# Patient Record
Sex: MALE | Race: BLACK OR AFRICAN AMERICAN | Employment: FULL TIME | URBAN - METROPOLITAN AREA
[De-identification: names, ages, dates, MRNs, and addresses within clinical notes are randomized per-mention and may not be internally consistent; named-entity substitution may affect disease eponyms.]

---

## 2021-04-06 ENCOUNTER — HOSPITAL ENCOUNTER (EMERGENCY)
Age: 56
Discharge: HOME | End: 2021-04-06
Attending: EMERGENCY MEDICINE
Payer: COMMERCIAL

## 2021-04-06 ENCOUNTER — APPOINTMENT (OUTPATIENT)
Dept: GENERAL RADIOLOGY | Age: 56
End: 2021-04-06
Payer: COMMERCIAL

## 2021-04-06 ENCOUNTER — APPOINTMENT (OUTPATIENT)
Dept: CT IMAGING | Age: 56
End: 2021-04-06
Payer: COMMERCIAL

## 2021-04-06 VITALS
HEIGHT: 71 IN | BODY MASS INDEX: 44.1 KG/M2 | TEMPERATURE: 98.1 F | DIASTOLIC BLOOD PRESSURE: 128 MMHG | RESPIRATION RATE: 16 BRPM | SYSTOLIC BLOOD PRESSURE: 189 MMHG | HEART RATE: 111 BPM | OXYGEN SATURATION: 97 % | WEIGHT: 315 LBS

## 2021-04-06 DIAGNOSIS — S52.044A NONDISPLACED FRACTURE OF CORONOID PROCESS OF RIGHT ULNA, INITIAL ENCOUNTER FOR CLOSED FRACTURE: ICD-10-CM

## 2021-04-06 DIAGNOSIS — S43.014A ANTERIOR DISLOCATION OF RIGHT SHOULDER, INITIAL ENCOUNTER: Primary | ICD-10-CM

## 2021-04-06 PROCEDURE — 73030 X-RAY EXAM OF SHOULDER: CPT

## 2021-04-06 PROCEDURE — 6370000000 HC RX 637 (ALT 250 FOR IP): Performed by: STUDENT IN AN ORGANIZED HEALTH CARE EDUCATION/TRAINING PROGRAM

## 2021-04-06 PROCEDURE — 29105 APPLICATION LONG ARM SPLINT: CPT

## 2021-04-06 PROCEDURE — 73070 X-RAY EXAM OF ELBOW: CPT

## 2021-04-06 PROCEDURE — 99283 EMERGENCY DEPT VISIT LOW MDM: CPT

## 2021-04-06 PROCEDURE — 70450 CT HEAD/BRAIN W/O DYE: CPT

## 2021-04-06 PROCEDURE — 73502 X-RAY EXAM HIP UNI 2-3 VIEWS: CPT

## 2021-04-06 PROCEDURE — 6360000002 HC RX W HCPCS: Performed by: STUDENT IN AN ORGANIZED HEALTH CARE EDUCATION/TRAINING PROGRAM

## 2021-04-06 PROCEDURE — 23650 CLTX SHO DSLC W/MNPJ WO ANES: CPT

## 2021-04-06 PROCEDURE — 96374 THER/PROPH/DIAG INJ IV PUSH: CPT

## 2021-04-06 RX ORDER — IBUPROFEN 400 MG/1
600 TABLET ORAL EVERY 6 HOURS PRN
Status: DISCONTINUED | OUTPATIENT
Start: 2021-04-06 | End: 2021-04-06 | Stop reason: HOSPADM

## 2021-04-06 RX ORDER — LIDOCAINE HYDROCHLORIDE 10 MG/ML
20 INJECTION, SOLUTION INFILTRATION; PERINEURAL ONCE
Status: DISCONTINUED | OUTPATIENT
Start: 2021-04-06 | End: 2021-04-06

## 2021-04-06 RX ORDER — PROPOFOL 10 MG/ML
200 INJECTION, EMULSION INTRAVENOUS ONCE
Status: DISCONTINUED | OUTPATIENT
Start: 2021-04-06 | End: 2021-04-06

## 2021-04-06 RX ORDER — FENTANYL CITRATE 50 UG/ML
50 INJECTION, SOLUTION INTRAMUSCULAR; INTRAVENOUS ONCE
Status: COMPLETED | OUTPATIENT
Start: 2021-04-06 | End: 2021-04-06

## 2021-04-06 RX ORDER — IBUPROFEN 800 MG/1
800 TABLET ORAL EVERY 6 HOURS PRN
Qty: 15 TABLET | Refills: 0 | Status: SHIPPED | OUTPATIENT
Start: 2021-04-06

## 2021-04-06 RX ORDER — FENTANYL CITRATE 50 UG/ML
100 INJECTION, SOLUTION INTRAMUSCULAR; INTRAVENOUS ONCE
Status: DISCONTINUED | OUTPATIENT
Start: 2021-04-06 | End: 2021-04-06

## 2021-04-06 RX ADMIN — FENTANYL CITRATE 50 MCG: 50 INJECTION, SOLUTION INTRAMUSCULAR; INTRAVENOUS at 18:30

## 2021-04-06 RX ADMIN — IBUPROFEN 600 MG: 400 TABLET, FILM COATED ORAL at 19:24

## 2021-04-06 ASSESSMENT — ENCOUNTER SYMPTOMS
NAUSEA: 0
RHINORRHEA: 0
COUGH: 0
SHORTNESS OF BREATH: 0
CHOKING: 0
COLOR CHANGE: 0
ALLERGIC/IMMUNOLOGIC NEGATIVE: 1
VOMITING: 0
CHEST TIGHTNESS: 0
STRIDOR: 0
APNEA: 0
WHEEZING: 0
EYES NEGATIVE: 1

## 2021-04-06 ASSESSMENT — PAIN SCALES - GENERAL
PAINLEVEL_OUTOF10: 8
PAINLEVEL_OUTOF10: 7
PAINLEVEL_OUTOF10: 8

## 2021-04-06 ASSESSMENT — PAIN DESCRIPTION - DESCRIPTORS: DESCRIPTORS: ACHING;SHARP

## 2021-04-06 NOTE — ED PROVIDER NOTES
file   Lifestyle    Physical activity     Days per week: Not on file     Minutes per session: Not on file    Stress: Not on file   Relationships    Social connections     Talks on phone: Not on file     Gets together: Not on file     Attends Church service: Not on file     Active member of club or organization: Not on file     Attends meetings of clubs or organizations: Not on file     Relationship status: Not on file    Intimate partner violence     Fear of current or ex partner: Not on file     Emotionally abused: Not on file     Physically abused: Not on file     Forced sexual activity: Not on file   Other Topics Concern    Not on file   Social History Narrative    Not on file       History reviewed. No pertinent family history. Allergies:    Patient has no known allergies. Home Medications:  Prior to Admission medications    Medication Sig Start Date End Date Taking? Authorizing Provider   LISINOPRIL PO Take by mouth Indications: pt unsure of dose. Historical Provider, MD       REVIEW OF SYSTEMS    (2-9 systems for level 4, 10 or more for level 5)    Review of Systems   Constitutional: Negative for activity change, appetite change, chills, diaphoresis, fatigue, fever and unexpected weight change. HENT: Negative for postnasal drip and rhinorrhea. Eyes: Negative. Respiratory: Negative for apnea, cough, choking, chest tightness, shortness of breath, wheezing and stridor. Cardiovascular: Negative for chest pain, palpitations and leg swelling. Gastrointestinal: Negative for nausea and vomiting. Endocrine: Negative. Genitourinary: Negative. Musculoskeletal:        Pain in right shoulder right elbow right hip   Skin: Negative for color change, pallor, rash and wound. Allergic/Immunologic: Negative. Neurological: Positive for numbness. Negative for dizziness, tremors, seizures, syncope, facial asymmetry, speech difficulty, weakness, light-headedness and headaches. EKG):  Orders Placed This Encounter   Procedures    XR SHOULDER RIGHT (MIN 2 VIEWS)    XR ELBOW RIGHT (2 VIEWS)    XR HIP RIGHT (2-3 VIEWS)    CT HEAD WO CONTRAST    XR SHOULDER RIGHT (MIN 2 VIEWS)       MEDICATIONS ORDERED:  Orders Placed This Encounter   Medications    DISCONTD: propofol injection 200 mg    DISCONTD: fentaNYL (SUBLIMAZE) injection 100 mcg    DISCONTD: lidocaine 1 % injection 20 mL    fentaNYL (SUBLIMAZE) injection 50 mcg    ibuprofen (ADVIL;MOTRIN) tablet 600 mg         DIAGNOSTIC RESULTS / EMERGENCYDEPARTMENT COURSE / MDM   LABS:  Labs Reviewed - No data to display    RADIOLOGY:  Xr Shoulder Right (min 2 Views)    Addendum Date: 4/6/2021    ADDENDUM: Additional scapular Y-view confirms anterior dislocation at the glenohumeral joint. Result Date: 4/6/2021  EXAMINATION: THREE XRAY VIEWS OF THE RIGHT SHOULDER 4/6/2021 12:20 pm COMPARISON: None. HISTORY: ORDERING SYSTEM PROVIDED HISTORY: pain in right shoulder after fall TECHNOLOGIST PROVIDED HISTORY: pain in right shoulder after fall FINDINGS: There is a glenohumeral dislocation on the frontal view with a nondiagnostic axillary view, though the dislocation is presumed to be anterior. Acromioclavicular alignment appears grossly maintained. Glenohumeral dislocation, presumed to be anterior which could be confirmed with a scapular Y-view if clinical exam remains indeterminate. Xr Elbow Right (2 Views)    Result Date: 4/6/2021  EXAMINATION: TWO XRAY VIEWS OF THE RIGHT ELBOW 4/6/2021 12:20 pm COMPARISON: None. HISTORY: ORDERING SYSTEM PROVIDED HISTORY: pain in right elbow after fall TECHNOLOGIST PROVIDED HISTORY: pain in right elbow after fall FINDINGS: There is a small linear lucency suggested through the coronoid process of the ulna on the lateral view. Sizable left groin on enthesophyte. Sequelae of chronic medial epicondylitis. No elbow effusion. Posterior soft tissue swelling.      Suggestion of a nondisplaced fracture through the coronoid process of the ulna on the lateral view. Posterior soft tissue swelling. Moderate olecranon enthesophyte and sequelae of chronic medial epicondylitis. Xr Hip Right (2-3 Views)    Result Date: 4/6/2021  EXAMINATION: TWO XRAY VIEWS OF THE RIGHT HIP 4/6/2021 3:20 pm COMPARISON: None. HISTORY: ORDERING SYSTEM PROVIDED HISTORY: pain in right hip after fall TECHNOLOGIST PROVIDED HISTORY: pain in right hip after fall FINDINGS: Visualized bony pelvis is intact. There is no acute osseous abnormality. There is degenerative change of the right hip joint. The surrounding soft tissues are unremarkable. No acute osseous or soft tissue abnormality. Degenerative change of the right hip joint. Ct Head Wo Contrast    Result Date: 4/6/2021  EXAMINATION: CT OF THE HEAD WITHOUT CONTRAST  4/6/2021 1:16 pm TECHNIQUE: CT of the head was performed without the administration of intravenous contrast. Dose modulation, iterative reconstruction, and/or weight based adjustment of the mA/kV was utilized to reduce the radiation dose to as low as reasonably achievable. COMPARISON: None. HISTORY: ORDERING SYSTEM PROVIDED HISTORY: patient on eliquis had a fall. TECHNOLOGIST PROVIDED HISTORY: patient on eliquis had a fall. Decision Support Exception->Emergency Medical Condition (MA) Reason for Exam: PATIENT ON ELIQUIS HAD A FALL FINDINGS: BRAIN/VENTRICLES: There is no acute intracranial hemorrhage, mass effect or midline shift. No abnormal extra-axial fluid collection. The gray-white differentiation is maintained without evidence of an acute infarct. There is no evidence of hydrocephalus. ORBITS: The visualized portion of the orbits demonstrate no acute abnormality.  SINUSES: The visualized paranasal sinuses and mastoid air cells demonstrate no acute abnormality on a background of mild chronic sinus mucosal thickening in the ethmoid air cells and small retention cysts in the right maxillary and left sphenoid sinuses. SOFT TISSUES/SKULL:  No acute abnormality of the visualized skull or soft tissues. Round metallic radiopaque retained foreign body which appears to be a BB in the soft tissues just anterior to the maxilla at the inferolateral aspect of the nose. No acute intracranial abnormality. Apparent retained BB in the soft tissues at the right inferolateral aspect of the nose just anterior to the maxilla. EKG- not required    Impression:        EMERGENCY DEPARTMENT COURSE:  ED Course as of Apr 06 1842   Tue Apr 06, 2021   1516 Patient was evaluated bedside. Will review right shoulder x-ray, right elbow x-ray, right hip x-ray and CT head without contrast.    [YEVGENIY]   1740 X-ray reviewed-showed right elbow dislocation, and right coracoid process undisplaced fracture. No acute abnormalities on right hip and CT head. Planned for relocation of right shoulder and posterior elbow splint. Patient was evaluated again before relocating shoulder but noticed to have clinical improvement in pain and gained range of motion. Repeated bedside x-ray right shoulder which showed-relocation of humerus head. Will continue right arm sling and right posterior elbow splint. [YEVGENIY]   1838 Posterior elbow splint was applied. [YEVGENIY]      ED Course User Index  [YEVGENIY] Lyndsay Henriquez MD       MDM    PROCEDURES:  Right posterior elbow splint applied. CONSULTS:  None    CRITICAL CARE:  Please see attending note    FINAL IMPRESSION     1. Anterior dislocation of right shoulder, initial encounter    2. Nondisplaced fracture of coronoid process of right ulna, initial encounter for closed fracture          DISPOSITION / PLAN   DISPOSITION        Evaluation and treatment course in the ED, and plan of care upon discharge was discussed in length with the patient. Patient had no further questions prior to being discharged and was instructed to return to the ED for new or worsening symptoms.   Any changes to existing medications

## 2021-04-06 NOTE — ED PROVIDER NOTES
[facility]  Emergency Department  Faculty Attestation     I performed a history and physical examination of the patient and discussed management with the resident. I reviewed the residents note and agree with the documented findings and plan of care. Any areas of disagreement are noted on the chart. I was personally present for the key portions of any procedures. I have documented in the chart those procedures where I was not present during the key portions. I have reviewed the emergency nurses triage note. I agree with the chief complaint, past medical history, past surgical history, allergies, medications, social and family history as documented unless otherwise noted below. For Physician Assistant/ Nurse Practitioner cases/documentation I have personally evaluated this patient and have completed at least one if not all key elements of the E/M (history, physical exam, and MDM). Additional findings are as noted. Primary Care Physician:  Carmelo Tarango MD    Screenings:  [unfilled]    CHIEF COMPLAINT       Chief Complaint   Patient presents with    Fall       RECENT VITALS:   Temp: 98.1 °F (36.7 °C),  Pulse: 111, Resp: 16, BP: (!) 146/110    LABS:  Labs Reviewed - No data to display    Radiology  CT HEAD WO CONTRAST   Final Result   No acute intracranial abnormality. Apparent retained BB in the soft tissues at the right inferolateral aspect of   the nose just anterior to the maxilla. XR SHOULDER RIGHT (MIN 2 VIEWS)   Final Result   Addendum 1 of 1   ADDENDUM:   Additional scapular Y-view confirms anterior dislocation at the    glenohumeral   joint. Final      XR ELBOW RIGHT (2 VIEWS)   Final Result   Suggestion of a nondisplaced fracture through the coronoid process of the   ulna on the lateral view. Posterior soft tissue swelling. Moderate olecranon enthesophyte and sequelae of chronic medial epicondylitis.          XR HIP RIGHT (2-3

## 2021-04-06 NOTE — ED NOTES
Pt to ED from work with c/o fall. Pt reports he was at work and fell [employer] of a ladder, into a hole,\" onto his right side. Pt reports he was probably 5ft off of the ground when he fell. Pt is unsure if he hit his head and denies any LOC. Pt sitting in wheelchair. RR even and non labored. NAD noted at this time.       Taina Velez RN  04/06/21 [id number]

## 2021-04-06 NOTE — ED PROVIDER NOTES
Views)    Result Date: 4/6/2021  EXAMINATION: TWO XRAY VIEWS OF THE RIGHT ELBOW 4/6/2021 12:20 pm COMPARISON: None. HISTORY: ORDERING SYSTEM PROVIDED HISTORY: pain in right elbow after fall TECHNOLOGIST PROVIDED HISTORY: pain in right elbow after fall FINDINGS: There is a small linear lucency suggested through the coronoid process of the ulna on the lateral view. Sizable left groin on enthesophyte. Sequelae of chronic medial epicondylitis. No elbow effusion. Posterior soft tissue swelling. Suggestion of a nondisplaced fracture through the coronoid process of the ulna on the lateral view. Posterior soft tissue swelling. Moderate olecranon enthesophyte and sequelae of chronic medial epicondylitis. Xr Hip Right (2-3 Views)    Result Date: 4/6/2021  EXAMINATION: TWO XRAY VIEWS OF THE RIGHT HIP 4/6/2021 3:20 pm COMPARISON: None. HISTORY: ORDERING SYSTEM PROVIDED HISTORY: pain in right hip after fall TECHNOLOGIST PROVIDED HISTORY: pain in right hip after fall FINDINGS: Visualized bony pelvis is intact. There is no acute osseous abnormality. There is degenerative change of the right hip joint. The surrounding soft tissues are unremarkable. No acute osseous or soft tissue abnormality. Degenerative change of the right hip joint. Ct Head Wo Contrast    Result Date: 4/6/2021  EXAMINATION: CT OF THE HEAD WITHOUT CONTRAST  4/6/2021 1:16 pm TECHNIQUE: CT of the head was performed without the administration of intravenous contrast. Dose modulation, iterative reconstruction, and/or weight based adjustment of the mA/kV was utilized to reduce the radiation dose to as low as reasonably achievable. COMPARISON: None. HISTORY: ORDERING SYSTEM PROVIDED HISTORY: patient on elideborahis had a fall. TECHNOLOGIST PROVIDED HISTORY: patient on eliquis had a fall.  Decision Support Exception->Emergency Medical Condition (MA) Reason for Exam: PATIENT ON ELIDEBORAHIS HAD A FALL FINDINGS: BRAIN/VENTRICLES: There is no acute intracranial

## 2021-05-03 ENCOUNTER — OFFICE VISIT (OUTPATIENT)
Dept: ORTHOPEDIC SURGERY | Age: 56
End: 2021-05-03
Payer: COMMERCIAL

## 2021-05-03 VITALS — BODY MASS INDEX: 44.1 KG/M2 | WEIGHT: 315 LBS | HEIGHT: 71 IN

## 2021-05-03 DIAGNOSIS — S43.014D ANTERIOR DISLOCATION OF RIGHT HUMERUS, SUBSEQUENT ENCOUNTER: Primary | ICD-10-CM
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
## 2021-05-03 DIAGNOSIS — M25.511 RIGHT SHOULDER PAIN, UNSPECIFIED CHRONICITY: ICD-10-CM

## 2021-05-03 PROCEDURE — 99204 OFFICE O/P NEW MOD 45 MIN: CPT | Performed by: ORTHOPAEDIC SURGERY

## 2021-05-03 RX ORDER — TERAZOSIN 10 MG/1
10 CAPSULE ORAL 2 TIMES DAILY
COMMUNITY
Start: 2021-03-24

## 2021-05-03 RX ORDER — CARVEDILOL 25 MG/1
TABLET ORAL
COMMUNITY
Start: 2021-03-11

## 2021-05-03 RX ORDER — SPIRONOLACTONE AND HYDROCHLOROTHIAZIDE 50; 50 MG/1; MG/1
TABLET, FILM COATED ORAL
COMMUNITY
Start: 2021-03-12

## 2021-05-03 ASSESSMENT — ENCOUNTER SYMPTOMS
ABDOMINAL PAIN: 0
COUGH: 0
APNEA: 0
CHEST TIGHTNESS: 0
VOMITING: 0

## 2021-05-03 NOTE — LETTER
[facility]  [address]  Phone: [phone]  Fax: [facility] Arian Lara DO        May 3, 2021     Patient: Steve Del Castillo   YOB: 1965   Date of Visit: 5/3/2021       To Whom it May Concern:    Juan Akers was seen in my clinic on 5/3/2021. He was seen in the office starting at 8 am until 10:30am. Patient can return to work 5/3/21 with restrictions of no overhead work and no lifting more than 5 pounds. Patient is okay to drive. If you have any questions or concerns, please don't hesitate to call.     Sincerely,       Elsa Overton, DO

## 2021-05-03 NOTE — PROGRESS NOTES
Neurological: Negative for dizziness, weakness and numbness. I have reviewed the CC, HPI, ROS, PMH, FHX, Social History, and if not present in this note, I have reviewed in the patient's chart. I agree with the documentation provided by other staff and have reviewed their documentation prior to providing my signature indicating agreement. Objective :   General: Karen Abraham is a 50 y.o. male who is alert and oriented and sitting comfortably in our office. Ortho Exam  MS:   F=110 degrees right shoulder. Abduction is 80 degrees. No gross instability of the right shoulder is appreciated. Rotator cuff strength appears to be intact and preserved bilaterally. Motor, sensory, vascular examination of the right upper extremity is grossly intact without focal deficits. Patient has full range of motion of the cervical spine and right elbow. Neuro: alert. oriented  Eyes: Extra-ocular muscles intact  Mouth: Oral mucosa moist. No perioral lesions  Pulm: Respirations unlabored and regular. Skin: warm, well perfused  Psych:   Patient has good fund of knowledge and displays understanging of exam, diagnosis, and plan. Radiology:     Xr Shoulder Right (min 2 Views)    Result Date: 5/3/2021  History: Right shoulder pain Findings: AP, scapular Y, axial view x-rays of the right shoulder done in the office today shows mild glenohumeral joint space narrowing, sclerotic changes at the greater tuberosity insertion of the rotator cuff, mild to moderate degenerative narrowing at the acromioclavicular joint. Humeral head is well-maintained within the glenoid. No further evidence of fracture, subluxation, dislocation, radiopaque foreign body, radiopaque tumors noted. Impression: Mild right shoulder degenerative changes as described above.     Xr Shoulder Right (min 2 Views)    Result Date: 4/6/2021  EXAMINATION: THREE XRAY VIEWS OF THE RIGHT SHOULDER 4/6/2021 2:29 pm COMPARISON: Shoulder radiographs from earlier today HISTORY: ORDERING SYSTEM PROVIDED HISTORY: reevaluate for relocation. TECHNOLOGIST PROVIDED HISTORY: reevaluate for relocation. Reason for Exam: fell FINDINGS: The humeral head is now appropriately seated in the glenoid fossa status post reduction without definitive fracture. Reduction of the anterior glenohumeral dislocation which is now in appropriate alignment. While there is no discrete fracture appreciated radiographically, CT is available if clinical concern persists. Xr Shoulder Right (min 2 Views)    Addendum Date: 4/6/2021    ADDENDUM: Additional scapular Y-view confirms anterior dislocation at the glenohumeral joint. Result Date: 4/6/2021  EXAMINATION: THREE XRAY VIEWS OF THE RIGHT SHOULDER 4/6/2021 12:20 pm COMPARISON: None. HISTORY: ORDERING SYSTEM PROVIDED HISTORY: pain in right shoulder after fall TECHNOLOGIST PROVIDED HISTORY: pain in right shoulder after fall FINDINGS: There is a glenohumeral dislocation on the frontal view with a nondiagnostic axillary view, though the dislocation is presumed to be anterior. Acromioclavicular alignment appears grossly maintained. Glenohumeral dislocation, presumed to be anterior which could be confirmed with a scapular Y-view if clinical exam remains indeterminate. Xr Elbow Right (2 Views)    Result Date: 4/6/2021  EXAMINATION: TWO XRAY VIEWS OF THE RIGHT ELBOW 4/6/2021 12:20 pm COMPARISON: None. HISTORY: ORDERING SYSTEM PROVIDED HISTORY: pain in right elbow after fall TECHNOLOGIST PROVIDED HISTORY: pain in right elbow after fall FINDINGS: There is a small linear lucency suggested through the coronoid process of the ulna on the lateral view. Sizable left groin on enthesophyte. Sequelae of chronic medial epicondylitis. No elbow effusion. Posterior soft tissue swelling. Suggestion of a nondisplaced fracture through the coronoid process of the ulna on the lateral view. Posterior soft tissue swelling.  Moderate olecranon enthesophyte and body which appears to be a BB in the soft tissues just anterior to the maxilla at the inferolateral aspect of the nose. No acute intracranial abnormality. Apparent retained BB in the soft tissues at the right inferolateral aspect of the nose just anterior to the maxilla. Assessment:      1. Anterior dislocation of right humerus, subsequent encounter    2. Right shoulder pain, unspecified chronicity    3. Fall from ladder, subsequent encounter         Plan:    Reviewed x-rays with the patient today in the office. Discussed with the patient that his shoulder appears to be stable on exam and x-ray. With the patient already having 4 weeks of rest I feel it is neccessary for the patient to start formal therapy. This will have to be approved by [facility] before he can start formal therapy. The office will send C-9 to [facility] for approval of therapy. Patient can dicontinue sling at this time. The patient is okay to return to work today with restrictions of No lifting more than 5 pounds and no over the shoulder work. Follow up in the office in 6 weeks. The patient knows to call with any questions or concerns. Follow up: Return in about 6 weeks (around 6/14/2021). No orders of the defined types were placed in this encounter. Orders Placed This Encounter   Procedures    XR SHOULDER RIGHT (MIN 2 VIEWS)     Standing Status:   Future     Number of Occurrences:   1     Standing Expiration Date:   5/4/2022     IKelly LPN am scribing for and in the presence of Dr. Ravindra Carballo  5/3/2021 5:49 PM      I have reviewed and made changes accordingly to the work scribed by Kelly Montana LPN. The documentation accurately reflects work and decisions made by me. I have also reviewed documentation completed by clinical staff.     Ravindra Carballo DO, [address] [facility] Sports Medicine  5/4/2021 6:51 AM    This note is created with the assistance of a speech recognition program.  While intending

## 2021-05-07 ENCOUNTER — TELEPHONE (OUTPATIENT)
Dept: ORTHOPEDIC SURGERY | Age: 56
End: 2021-05-07

## 2021-05-07 NOTE — TELEPHONE ENCOUNTER
Megan Charles from [employer] called to request a consult report/office notes from the following date:  5/3/21    Please fax to: [phone] attn: Jay Whalen #: [phone]
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
## 2021-05-13 DIAGNOSIS — M25.511 RIGHT SHOULDER PAIN, UNSPECIFIED CHRONICITY: Primary | ICD-10-CM

## 2021-05-13 DIAGNOSIS — S43.014D ANTERIOR DISLOCATION OF RIGHT HUMERUS, SUBSEQUENT ENCOUNTER: ICD-10-CM

## 2021-05-19 ENCOUNTER — HOSPITAL ENCOUNTER (OUTPATIENT)
Dept: PHYSICAL THERAPY | Age: 56
Setting detail: THERAPIES SERIES
Discharge: HOME | End: 2021-05-19
Payer: COMMERCIAL

## 2021-05-19 PROCEDURE — 97161 PT EVAL LOW COMPLEX 20 MIN: CPT

## 2021-05-19 PROCEDURE — 97110 THERAPEUTIC EXERCISES: CPT

## 2021-05-19 NOTE — CONSULTS
[x] Benedicto Hockey  Outpatient Physical Therapy  [address]  Phone: [phone]  Fax: [phone] [] [facility] at [address] [place] [address]  Phone: [phone]   Fax: [phone]     Physical Therapy Evaluation    Date:  2021  Patient: Prema Bruno  : 1965  MRN: [id number]  Physician: Tyrel Walker DO   Insurance: [facility] 18 visits 21-21  Medical Diagnosis: Right shoulder pain, unspecified chronicity (M25.511 [ICD-10-CM]); Fall from ladder, subsequent encounter (W11. XXXD [ICD-10-CM]); Anterior dislocation of right humerus, subsequent encounter (S43.014D [ICD-10-CM])    Rehab Codes: M25.511, M25.611  Onset Date: 21                                Next 's appt:     Subjective:   CC: Patient reports ongoing R shoulder pain and limited function following his work injury. Patient currently has difficulty with all shoulder motion. He is unable to lift, carry, push or pull with his R arm. HPI: Patient was injured working as a , states his ladder slipped and he fell to the ground while on the ladder injuring his R shoulder. SR shoulder was dislocated anteriorly. Patient went to ER the following day. PMHx: [] Unremarkable [] Diabetes [x] HTN  [] Pacemaker   [] MI/Heart Problems [] Cancer [x] Arthritis [] Asthma                         [x] refer to full medical chart  In EPIC  [x] Other: Hx of DVT in L LE early         Comorbidities:   [x] Obesity [] Dialysis  [] Other:   [] Asthma/COPD [] Dementia [] Other:   [] Stroke [] Sleep apnea [] Other:   [] Vascular disease [] Rheumatic disease [] Other:     Tests: [x] X-Ray:5/3/21 [x] MRI:  [] Other:        Impression: Mild right shoulder degenerative changes as described above.        Medications: [x] Refer to full medical record [] None [] Other:  Allergies:      [x] Refer to full medical record  [] None [] Other:    Working:  [x] Full-time  [] Part-time  [] Off check the appropriate box. [x]  No intervention needed   Low =   Score of 0-24    OBSERVATION Comments   Posture Forward head, rounded shoulders, slumped sitting    Joint Alignment R shoulder protracted and depressed    Gait No Deficit    Palpation Diffuse tenderness anterior shoulder   Edema No Deficit    Neurological No Deficit      Assessment: Patient presents with R shoulder pain and dysfunction falling work injury. Concern for associated rotator cuff injury with anterior dislocation. Patient will benefit from from physical therapy to restore R shoulder ROM, strength, and function to return to full duty work. Problems:    [x] ? Pain:  4/10 at rest, 9/10 with movement R shoulder   [x] ? ROM: limited all planes of motion  [x] ? Flexibility:rotator cuff, pectorals, R Lat   [x] ? Strength: deltoids, rotator cuff, scapular stabilizers  [x] ? Function: UEFI: 16% functional   [] Other:    G: (to be met in 18 treatments)  1. ? Pain:2/10 R shoulder pain with over head activity. 2. ? ROM: R shoulder flexion to 150, abduction to 90, ER and IR to 60 degrees to improve reaching. 3. ? Strength: Patient is able to lift and carry 50 lb box without Right shoulder pain. 4. ? Function: UEFI score to 75% functional to return to full duty work. 5. Independent with Home Exercise Programs    Patient goals: Restore R shoulder function, RTW. Rehab Potential:  [x] Good  [] Fair  [] Poor   Suggested Professional Referral:  [x] No  [] Yes:  Barriers to Goal Achievement[de-identified]  [x] No  [] Yes:  Domestic Concerns:  [x] No  [] Yes:    Pt. Education:  [x] Plans/Goals, Risks/Benefits discussed  [x] Home exercise program: See chart below  Method of Education: [x] Verbal  [x] Demo  [x] Written  Comprehension of Education:  [x] Verbalizes understanding. [x] Demonstrates understanding. [x] Needs Review. [] Demonstrates/verbalizes understanding of HEP/Ed previously given.     Treatment Plan:  [x] Therapeutic Exercise   05267  [x] Vasopneumatic cold with compression  42710    [x] Therapeutic Activity  26148 [x] Cold/hotpack    [] Gait Training   [id number] [] Lumbar/Cervical Traction  [id number]   [x] Neuromuscular Re-education  [phone] [x] Electrical Stimulation Unattended  77694   [x] Manual Therapy    02255 [x] Electrical Stimulation Attended  [id number]   [] Iontophoresis: 4 mg/mL Dexamethasone Sodium Phosphate  mAmin  94243 []  Medication allergies reviewed for use of   Dexamethasone Sodium Phosphate 4mg/ml with iontophoresis treatments. Pt is not allergic. Frequency:  3 x/week for 18 visits    Todays Treatment:  Precautions:  Modalities:   Exercises:  Access Code: [email or web address]  URL: [email or web address]  Date: 05/19/2021  Prepared by: Kirstie Filler    Exercises  Seated Shoulder Flexion Towel Slide at Table Top - 1 x daily - 7 x weekly - 10 reps - 3 sets  Seated Shoulder Abduction Towel Slide at Table Top - 1 x daily - 7 x weekly - 10 reps - 3 sets  Circular Shoulder Pendulum with Table Support - 1 x daily - 7 x weekly - 10 reps - 3 sets  Seated Shoulder Shrug Circles AROM Backward - 1 x daily - 7 x weekly - 10 reps - 3 sets  Seated Elbow Flexion and Extension AROM - 1 x daily - 7 x weekly - 10 reps - 3 sets    Specific Instructions for next treatment: Begin gentle ROM exercises, likely start with PROM, pain modalities as needed.        Evaluation Complexity:  History (Personal factors, comorbidities) [] 0 [x] 1-2 [] 3+   Exam (limitations, restrictions) [] 1-2 [] 3 [] 4+   Clinical presentation (progression) [x] Stable [] Evolving  [] Unstable   Decision Making [x] Low [] Moderate [] High    [x] Low Complexity [] Moderate Complexity [] High Complexity       Treatment Charges: Mins Units   [x] Evaluation       [x]  Low       []  Moderate       []  High 25 1   []  Modalities     [x]  Ther Exercise 15 1   []  Manual Therapy     []  Ther Activities     []  Aquatics     []  Vasocompression     []  Other       TOTAL TREATMENT TIME: 40       Time

## 2021-05-24 ENCOUNTER — HOSPITAL ENCOUNTER (OUTPATIENT)
Dept: PHYSICAL THERAPY | Age: 56
Setting detail: THERAPIES SERIES
Discharge: HOME | End: 2021-05-24
Payer: COMMERCIAL

## 2021-05-24 PROCEDURE — 97110 THERAPEUTIC EXERCISES: CPT

## 2021-05-24 PROCEDURE — 97140 MANUAL THERAPY 1/> REGIONS: CPT

## 2021-05-24 PROCEDURE — 97016 VASOPNEUMATIC DEVICE THERAPY: CPT

## 2021-05-24 NOTE — FLOWSHEET NOTE
[x] [facility]) [facility] &  Therapy  [address]  [phone]  F: [phone] [] [address]  [place] [address]   [address]  P: [phone]  F: [phone] [] [place]  [address]  P: [phone]  F: [phone] [] [address]  P: [phone]  F: [phone] [] [address]  [facility]   [address]   [place]: [phone]  F: [phone]      Physical Therapy Daily Treatment Note    Date:  2021  Patient Name:  Ancelmo Clarke    :  1965  MRN: [id number]  Physician: Jackeline Doyle DO                     Insurance: [facility] 18 visits 21-21  Medical Diagnosis: Right shoulder pain, unspecified chronicity (M25.511 [ICD-10-CM]); Fall from ladder, subsequent encounter (W11. XXXD [ICD-10-CM]); Anterior dislocation of right humerus, subsequent encounter (S43.014D [ICD-10-CM])                          Rehab Codes: M25.511, M25.611  Onset Date: 21                                Next 's appt:  Visits:     Cancels/No Shows: 0/0    Subjective:    Pain:  [x] Yes  [] No Location: R shoulder  Pain Rating: (0-10 scale) 7/10  Pain altered Tx:  [] No  [] Yes  Action:  Comments: Shoulder is still painful with all motion. Patient has been compliant with HEP.      Objective:  Modalities: Vasopneumatic cryotherapy device (GameReady) to R shoulder at low pressure and 38 degrees x 15 min  Precautions:  Exercises:  Exercise Reps/ Time Weight/ Level Comments   Pulleys 2 min            Seated      Table slides 20x ea  Flexion, abduction   ER stretch on table  20x     Bicep curls  20x 3 lb    Shrugs  20x     Scap retraction  20x     Pronation supination  20x 1 lb          Supine      Cane press 20x     Cane flexion position for throwing). o Rope and Pulley  o Wand  o Finger Walk  3. Posterior cuff stretch in supine (cross arm adduction)  4. Manual stretching avoiding stretching to the anterior capsule (ER in the scapular plane and no shoulder extension)  5. Functional behind the back stretch (IR towel stretch) if needed  6. Mobilization of posterior cuff if needed  7. Elastic resistance for IR/ER with arm at side and elbow at 90 degrees (pain free ROM with ER): and scapular strengthening (shrugs, rows, etc.)  8. UBE  9. DB exercises for:  o Supraspinatus - full can in the scapular plane below shoulder level. o Shoulder flexion  o Shoulder abduction (pain free)  o Shoulder extension in prone (do not move the shoulder past the plane of the body)  o Shoulder rows in prone  o Serratus punch in supine (push up plus program)  o Shoulder shrugs  o Forearm/elbow strengthening  10. Rhythmic stabilization exercises (begin in the supine position progressing to the functional planes of motion)  11. PNF patterns with gentle manual resistance (progress by working into the dysfunctional plane of motion)  PHASE II:  1. Continue posterior cuff stretching  2. Continue shoulder strengthening exercises with free weights and elastic resistance (emphasize eccentric work on the rotator cuff, progress planes of motion to the 90/90 position)  3. Add lower trap pull downs with pulley system if available  4. Progress prone DB program by adding:  o horizontal abduction  o retraction with ER  o extension with palm forward  5. Plyotoss chest pass (progress to overhead and single arm)  6. Progress push plus program (wall push ups, modified floor, floor)  7. Begin progressive throwing program as advised by MD  8. Begin total body conditioning including a well organized core stability program for overhead athletes  9. Begin skill development at a low intensity level  10.  Continue with rhythmic stabilization exercises with resistance and in the functional planes of motion. 11. Continue PNF patterns  12. Utilize manual resisted techniques or elastic resistance to emphasize eccentric loading for the posterior cuff. PHASE III:  Focus on progressive exercises in preparation for returning to the prior activity level. 1. Continue flexibility/mobility exercises  2. Continue progressive throwing program  3. Continue with strengthening  4. May add overhead strengthening ( press)  5. May progress to bench program.  o Regular  o Incline  o Decline  6. Continue UBE  7. Continue total body conditioning  8. Progress skill development. Begin practicing skills specific to the activity (work, recreational activity, sport, etc.).         Time In: 4:10 pm          Time Out: 5:10 pm    Electronically signed by:  Kimberly Hunt PT

## 2021-05-26 ENCOUNTER — HOSPITAL ENCOUNTER (OUTPATIENT)
Dept: PHYSICAL THERAPY | Age: 56
Setting detail: THERAPIES SERIES
Discharge: HOME | End: 2021-05-26
Payer: COMMERCIAL

## 2021-05-26 PROCEDURE — 97110 THERAPEUTIC EXERCISES: CPT

## 2021-05-26 PROCEDURE — 97140 MANUAL THERAPY 1/> REGIONS: CPT

## 2021-05-26 PROCEDURE — 97016 VASOPNEUMATIC DEVICE THERAPY: CPT

## 2021-05-26 NOTE — FLOWSHEET NOTE
[x] [facility]) [facility] &  Therapy  [address]  [phone]  F: [phone] [] [address]  [facility]   [address]  P: [phone]  F: [phone] [] [address]  [address]  P: [phone]  F: [phone] [] [address]  P: [phone]  F: [phone] [] [address]  [facility]   [address]   [place]: [phone]  F: [phone]      Physical Therapy Daily Treatment Note    Date:  2021  Patient Name:  Danielle Esparza    :  1965  MRN: [id number]  Physician: aCyetano Myers DO                     Insurance: [facility] 18 visits 21-21  Medical Diagnosis: Right shoulder pain, unspecified chronicity (M25.511 [ICD-10-CM]); Fall from ladder, subsequent encounter (W11. XXXD [ICD-10-CM]); Anterior dislocation of right humerus, subsequent encounter (S43.014D [ICD-10-CM])                          Rehab Codes: M25.511, M25.611  Onset Date: 21                                Next 's appt:  Visits: 3/18    Cancels/No Shows: 0/0    Subjective:    Pain:  [x] Yes  [] No Location: R shoulder  Pain Rating: (0-10 scale) 7/10  Pain altered Tx:  [] No  [] Yes  Action:  Comments: Patient woke up with high pain level in his shoulder and his knees.     Objective:  Modalities: Vasopneumatic cryotherapy device (GameReady) to R shoulder at low pressure and 38 degrees x 15 min  Precautions:  Exercises:  Exercise Reps/ Time Weight/ Level Comments   Pulleys 2 min      UBE 2/2 min            Seated      Table slides w/ball  20x ea green Flexion, abduction, side to side circles    ER stretch on table  20x     Bicep curls  20x 4 lb    Shrugs  20x     Scap retraction  20x     Pronation supination  20x 1 lb Supine      Cane press 20x     Cane flexion  10x                       Other:    Manual Therapy: PROM to R shoulder with gentle distraction in all planes of motion x 10 minutes. Treatment Charges: Mins Units    []  Modalities      [x]  Ther Exercise 35 2    []  Manual Therapy 10 1    []  Ther Activities      []  Aquatics      [x]  Vasocompression 10 1    []  Other      Total Treatment time 55 4        Assessment: [x] Progressing toward goals. AAROM flexion and abduction showed improvement this treatment. Active motion remains painful. [x] No change. [] Other:  [x] Patient would continue to benefit from skilled physical therapy services in order to: Patient will benefit from from physical therapy to restore R shoulder ROM, strength, and function to return to full duty work. STG/LTG  G: (to be met in 18 treatments)  1. ? Pain:2/10 R shoulder pain with over head activity. 2. ? ROM: R shoulder flexion to 150, abduction to 90, ER and IR to 60 degrees to improve reaching. 3. ? Strength: Patient is able to lift and carry 50 lb box without Right shoulder pain. 4. ? Function: UEFI score to 75% functional to return to full duty work. 5. Independent with Home Exercise Programs      Pt. Education:  [x] Yes  [] No  [x] Reviewed Prior HEP/Ed  Method of Education: [x] Verbal  [x] Demo  [] Written  Comprehension of Education:  [x] Verbalizes understanding. [x] Demonstrates understanding. [x] Needs review. [] Demonstrates/verbalizes HEP/Ed previously given. Plan: [x] Continue current frequency toward long and short term goals. [x] Specific Instructions for subsequent treatments: Add cane abduction and horizontal abduction . Rehab Protocol   PHASE I:  1. Apply modalities as needed (heat, ice, electrotherapy, etc.)  2. Perform range of motion exercises (passive, active-assistive) avoid abduction, extension and external rotation (cocked position for throwing).   o Rope and Pulley  o Wand  o Finger Walk  3. Posterior cuff stretch in supine (cross arm adduction)  4. Manual stretching avoiding stretching to the anterior capsule (ER in the scapular plane and no shoulder extension)  5. Functional behind the back stretch (IR towel stretch) if needed  6. Mobilization of posterior cuff if needed  7. Elastic resistance for IR/ER with arm at side and elbow at 90 degrees (pain free ROM with ER): and scapular strengthening (shrugs, rows, etc.)  8. UBE  9. DB exercises for:  o Supraspinatus - full can in the scapular plane below shoulder level. o Shoulder flexion  o Shoulder abduction (pain free)  o Shoulder extension in prone (do not move the shoulder past the plane of the body)  o Shoulder rows in prone  o Serratus punch in supine (push up plus program)  o Shoulder shrugs  o Forearm/elbow strengthening  10. Rhythmic stabilization exercises (begin in the supine position progressing to the functional planes of motion)  11. PNF patterns with gentle manual resistance (progress by working into the dysfunctional plane of motion)  PHASE II:  1. Continue posterior cuff stretching  2. Continue shoulder strengthening exercises with free weights and elastic resistance (emphasize eccentric work on the rotator cuff, progress planes of motion to the 90/90 position)  3. Add lower trap pull downs with pulley system if available  4. Progress prone DB program by adding:  o horizontal abduction  o retraction with ER  o extension with palm forward  5. Plyotoss chest pass (progress to overhead and single arm)  6. Progress push plus program (wall push ups, modified floor, floor)  7. Begin progressive throwing program as advised by MD  8. Begin total body conditioning including a well organized core stability program for overhead athletes  9. Begin skill development at a low intensity level  10. Continue with rhythmic stabilization exercises with resistance and in the functional planes of motion. 11. Continue PNF patterns  12.  Utilize manual resisted techniques or elastic resistance to emphasize eccentric loading for the posterior cuff. PHASE III:  Focus on progressive exercises in preparation for returning to the prior activity level. 1. Continue flexibility/mobility exercises  2. Continue progressive throwing program  3. Continue with strengthening  4. May add overhead strengthening ( press)  5. May progress to bench program.  o Regular  o Incline  o Decline  6. Continue UBE  7. Continue total body conditioning  8. Progress skill development. Begin practicing skills specific to the activity (work, recreational activity, sport, etc.).         Time In: 4:00 pm          Time Out: 5:00pm    Electronically signed by:  Chance Vasquez PT

## 2021-05-28 ENCOUNTER — HOSPITAL ENCOUNTER (OUTPATIENT)
Dept: PHYSICAL THERAPY | Age: 56
Setting detail: THERAPIES SERIES
Discharge: HOME | End: 2021-05-28
Payer: COMMERCIAL

## 2021-05-28 PROCEDURE — 97110 THERAPEUTIC EXERCISES: CPT

## 2021-05-28 NOTE — FLOWSHEET NOTE
[x] [facility]) - [facility] &  Therapy  [address]  [phone]  F: [phone] [] [address]  [place] [address]   [address]  P: [phone]  F: [phone] [] [place]  [address]  P: [phone]  F: [phone] [] [address]  P: [phone]  F: [phone] [] [address]  [facility]   [address]   [place]: [phone]  F: [phone]      Physical Therapy Daily Treatment Note    Date:  2021  Patient Name:  Jamie Tomlinson    :  1965  MRN: [id number]  Physician: Vinny Jimenez DO                     Insurance: [facility] 18 visits 21-21  Medical Diagnosis: Right shoulder pain, unspecified chronicity (M25.511 [ICD-10-CM]); Fall from ladder, subsequent encounter (W11. XXXD [ICD-10-CM]); Anterior dislocation of right humerus, subsequent encounter (S43.014D [ICD-10-CM])                          Rehab Codes: M25.511, M25.611  Onset Date: 21                                Next 's appt: 21  Visits:     Cancels/No Shows: 0/0    Subjective:    Pain:  [x] Yes  [] No Location: R shoulder  Pain Rating: (0-10 scale) 7/10  Pain altered Tx:  [x] No  [] Yes  Action:  Comments: States that he is aching today secondary to the weather. Notes he does take a lot of medications. Pain has been 3/10 in shoulder recently but could not pin point when. Patient notes he is sore after PT session but will push through as he wants to get back to work.      Objective:  Modalities: Vasopneumatic cryotherapy device (GameReady) to R shoulder at low pressure and 38 degrees x 15 min--patient declined need  Precautions:  Exercises:  Exercise Reps/ Time Weight/ Level Comments   Pulleys 2 min      UBE   2/2 min Seated      Table slides w/ball   20x ea green Flexion, abduction, side to side circles    ER stretch on table   20x     Bicep curls   20x 4 lb    Shrugs   20x     Scap retraction  20x     Pronation supination    20x 1 lb          Supine      Cane press    20x     Cane flexion     10x     Cane abduction 10x  added   Cane horizontal abduction 10x  added         Other:    Manual Therapy: PROM to R shoulder with gentle distraction in all planes of motion x 10 minutes. Treatment Charges: Mins Units    []  Modalities      [x]  Ther Exercise 35 2 2870-6790   []  Manual Therapy      []  Ther Activities      []  Aquatics      []  Vasocompression      []  Other      Total Treatment time 35 mins     -347    Assessment: [x] Progressing toward goals. Added supine cane abduction and horizontal abduction within pain free range to increase shoulder ROM. Patient declined need for game ready this date. [x] No change. Pain noted still with exercises. [] Other:  [x] Patient would continue to benefit from skilled physical therapy services in order to: Patient will benefit from from physical therapy to restore R shoulder ROM, strength, and function to return to full duty work. STG/LTG  G: (to be met in 18 treatments)  1. ? Pain:2/10 R shoulder pain with over head activity. 2. ? ROM: R shoulder flexion to 150, abduction to 90, ER and IR to 60 degrees to improve reaching. 3. ? Strength: Patient is able to lift and carry 50 lb box without Right shoulder pain. 4. ? Function: UEFI score to 75% functional to return to full duty work. 5. Independent with Home Exercise Programs      Pt. Education:  [x] Yes  [] No  [x] Reviewed Prior HEP/Ed  Method of Education: [x] Verbal  [x] Demo  [] Written  Comprehension of Education:  [x] Verbalizes understanding. [x] Demonstrates understanding. [x] Needs review. [] Demonstrates/verbalizes HEP/Ed previously given.      Plan: [x] Continue current frequency toward long floor)  7. Begin progressive throwing program as advised by MD  8. Begin total body conditioning including a well organized core stability program for overhead athletes  9. Begin skill development at a low intensity level  10. Continue with rhythmic stabilization exercises with resistance and in the functional planes of motion. 11. Continue PNF patterns  12. Utilize manual resisted techniques or elastic resistance to emphasize eccentric loading for the posterior cuff. PHASE III:  Focus on progressive exercises in preparation for returning to the prior activity level. 1. Continue flexibility/mobility exercises  2. Continue progressive throwing program  3. Continue with strengthening  4. May add overhead strengthening ( press)  5. May progress to bench program.  o Regular  o Incline  o Decline  6. Continue UBE  7. Continue total body conditioning  8. Progress skill development. Begin practicing skills specific to the activity (work, recreational activity, sport, etc.).         Time In: 3:43 pm          Time Out: 4:22 pm    Electronically signed by:  Amita Rodriguez PTA

## 2021-06-01 ENCOUNTER — HOSPITAL ENCOUNTER (OUTPATIENT)
Dept: PHYSICAL THERAPY | Age: 56
Setting detail: THERAPIES SERIES
Discharge: HOME | End: 2021-06-01
Payer: COMMERCIAL

## 2021-06-01 NOTE — FLOWSHEET NOTE
[x] [facility] &  Therapy  [address]    [phone]  F: [phone]   [] [address]  [facility] [address]   [address]  P: [phone]  F: [phone]  [] [address] &  Therapy  [address]  P: [phone]  F: [phone] [] [address]  P: [phone]  F: [phone]  [] [address] [place] [address]  [address] [facility] [address]   [address]   [place]: [phone]  F: [phone]   [] [place] [address] [facility] [address]  [place]: [phone]   F: [phone]     Physical Therapy Cancel/No Show note    Date: 2021  Patient: Rui Fields  : 1965  MRN: [id number]    Cancels/No Shows to date:     For today's appointment patient:    [x]  Cancelled    [] Rescheduled appointment    [] No-show     Reason given by patient:    [x]  Patient ill     []  Conflicting appointment    [] No transportation      [] Conflict with work    [] No reason given    [] Weather related    [] [id number]    [] Other:      Comments:        [] Next appointment was confirmed    Electronically signed by: Paul Muro PT

## 2021-06-02 ENCOUNTER — HOSPITAL ENCOUNTER (OUTPATIENT)
Dept: PHYSICAL THERAPY | Age: 56
Setting detail: THERAPIES SERIES
Discharge: HOME | End: 2021-06-02
Payer: COMMERCIAL

## 2021-06-02 PROCEDURE — 97110 THERAPEUTIC EXERCISES: CPT

## 2021-06-02 NOTE — FLOWSHEET NOTE
[x] [facility] &  Therapy  [address]  [phone]  F: [phone] [] [address]  [address]   [address]  P: [phone]  F: [phone] [] [place]  [address]  P: [phone]  F: [phone] [] [address]  P: [phone]  F: [phone] [] [address]  [facility]   [address]   [place]: [phone]  F: [phone]      Physical Therapy Daily Treatment Note    Date:  2021  Patient Name:  Ancelmo Clarke    :  1965  MRN: [id number]  Physician: Jackeline Doyle DO                     Insurance: [facility] 18 visits 21-21  Medical Diagnosis: Right shoulder pain, unspecified chronicity (M25.511 [ICD-10-CM]); Fall from ladder, subsequent encounter (W11. XXXD [ICD-10-CM]); Anterior dislocation of right humerus, subsequent encounter (S43.014D [ICD-10-CM])                          Rehab Codes: M25.511, M25.611  Onset Date: 21                                Next 's appt: 21  Visits:     Cancels/No Shows: 0/0    Subjective:    Pain:  [x] Yes  [] No Location: R shoulder  Pain Rating: (0-10 scale) 5/10  Pain altered Tx:  [x] No  [] Yes  Action:  Comments:  Patient verbalizes lower pain numeric and symptoms at arrival this date. Reports adherence to HEP given at evaluation 2-3x a day as able. States \"it hurts but I push through it\". States shoulder is \"just aching\" today, woke him up this morning due to pain.     Objective:  Modalities: Vasopneumatic cryotherapy device (GameReady) to R shoulder at low pressure and 38 degrees x 15 min--patient declined need  Precautions:  Exercises:  Exercise Reps/ Time Weight/ Level Comments   Pulleys 2 min      UBE   2/2 min Standing   Added 6/2   Towel slides 10x2\"     Scap retraction 10x3\"           Seated      Table slides w/ball   20x ea green Flexion, abduction, side to side circles    ER stretch on table   20x     Bicep curls   20x 4 lb    Shrugs   20x     Pronation supination    20x 2 lb          Supine      Cane press    20x     Cane flexion     10x     Cane abduction 10x     Cane horizontal abduction      Cane ER 10x  Added 6/2   Other:    Manual Therapy: PROM to R shoulder with gentle distraction in all planes of motion x 10 minutes. Treatment Charges: Mins Units    []  Modalities      [x]  Ther Exercise 46 3 [phone]   []  Manual Therapy      []  Ther Activities      []  Aquatics      []  Vasocompression      []  Other      Total Treatment time 46 mins 3    -359    Assessment: [x] Progressing toward goals. Added wall slides with towel to further increase ROM in flexion and abduction planes with greater activation through musculature with fair tolerance overall. Patient continues with considerable pain and difficulty with relaxation during PROM, however improved relaxation during latter half of passive range this date. Patient denies need for modalities. [] No change. [] Other:  [x] Patient would continue to benefit from skilled physical therapy services in order to: Patient will benefit from from physical therapy to restore R shoulder ROM, strength, and function to return to full duty work. STG/LTG  STG: (to be met in 18 treatments)  1. ? Pain:2/10 R shoulder pain with over head activity. 2. ? ROM: R shoulder flexion to 150, abduction to 90, ER and IR to 60 degrees to improve reaching. 3. ? Strength: Patient is able to lift and carry 50 lb box without Right shoulder pain. 4. ? Function: UEFI score to 75% functional to return to full duty work. 5. Independent with Home Exercise Programs      Pt.  Education:  [x] Yes  [] No  [x] Reviewed Prior HEP/Ed  Method of Education: [x] Verbal  [x] Demo [] Written  Comprehension of Education:  [x] Verbalizes understanding. [x] Demonstrates understanding. [x] Needs review. [] Demonstrates/verbalizes HEP/Ed previously given. Plan: [x] Continue current frequency toward long and short term goals. [x] Specific Instructions for subsequent treatments: progress as able  Rehab Protocol   PHASE I:  1. Apply modalities as needed (heat, ice, electrotherapy, etc.)  2. Perform range of motion exercises (passive, active-assistive) avoid abduction, extension and external rotation (cocked position for throwing). o Rope and Pulley  o Wand  o Finger Walk  3. Posterior cuff stretch in supine (cross arm adduction)  4. Manual stretching avoiding stretching to the anterior capsule (ER in the scapular plane and no shoulder extension)  5. Functional behind the back stretch (IR towel stretch) if needed  6. Mobilization of posterior cuff if needed  7. Elastic resistance for IR/ER with arm at side and elbow at 90 degrees (pain free ROM with ER): and scapular strengthening (shrugs, rows, etc.)  8. UBE  9. DB exercises for:  o Supraspinatus - full can in the scapular plane below shoulder level. o Shoulder flexion  o Shoulder abduction (pain free)  o Shoulder extension in prone (do not move the shoulder past the plane of the body)  o Shoulder rows in prone  o Serratus punch in supine (push up plus program)  o Shoulder shrugs  o Forearm/elbow strengthening  10. Rhythmic stabilization exercises (begin in the supine position progressing to the functional planes of motion)  11. PNF patterns with gentle manual resistance (progress by working into the dysfunctional plane of motion)  PHASE II:  1. Continue posterior cuff stretching  2. Continue shoulder strengthening exercises with free weights and elastic resistance (emphasize eccentric work on the rotator cuff, progress planes of motion to the 90/90 position)  3. Add lower trap pull downs with pulley system if available  4.  Progress prone DB program by adding:  o horizontal abduction  o retraction with ER  o extension with palm forward  5. Plyotoss chest pass (progress to overhead and single arm)  6. Progress push plus program (wall push ups, modified floor, floor)  7. Begin progressive throwing program as advised by MD  8. Begin total body conditioning including a well organized core stability program for overhead athletes  9. Begin skill development at a low intensity level  10. Continue with rhythmic stabilization exercises with resistance and in the functional planes of motion. 11. Continue PNF patterns  12. Utilize manual resisted techniques or elastic resistance to emphasize eccentric loading for the posterior cuff. PHASE III:  Focus on progressive exercises in preparation for returning to the prior activity level. 1. Continue flexibility/mobility exercises  2. Continue progressive throwing program  3. Continue with strengthening  4. May add overhead strengthening ( press)  5. May progress to bench program.  o Regular  o Incline  o Decline  6. Continue UBE  7. Continue total body conditioning  8. Progress skill development. Begin practicing skills specific to the activity (work, recreational activity, sport, etc.).         Time In: 3:55 pm          Time Out: 445 pm    Electronically signed by:  Tessa Del Angel PTA

## 2021-06-04 ENCOUNTER — HOSPITAL ENCOUNTER (OUTPATIENT)
Dept: PHYSICAL THERAPY | Age: 56
Setting detail: THERAPIES SERIES
Discharge: HOME | End: 2021-06-04
Payer: COMMERCIAL

## 2021-06-04 PROCEDURE — 97110 THERAPEUTIC EXERCISES: CPT

## 2021-06-04 NOTE — FLOWSHEET NOTE
[x] [facility]) [facility] CENTER &  Therapy  [address]  [phone]  F: [phone] [] [address]  [address]   [address]  P: [phone]  F: [phone] [] [place]  [address]  P: [phone]  F: [phone] [] [address]  P: [phone]  F: [phone] [] [address]  [facility]   [address]   [place]: [phone]  F: [phone]      Physical Therapy Daily Treatment Note    Date:  2021  Patient Name:  Lainey Betancourt    :  1965  MRN: [id number]  Physician: Adam Kincaid DO                     Insurance: [facility] 18 visits 21-21  Medical Diagnosis: Right shoulder pain, unspecified chronicity (M25.511 [ICD-10-CM]); Fall from ladder, subsequent encounter (W11. XXXD [ICD-10-CM]); Anterior dislocation of right humerus, subsequent encounter (S43.014D [ICD-10-CM])                          Rehab Codes: M25.511, M25.611  Onset Date: 21                                Next 's appt: 21  Visits:     Cancels/No Shows: 0/0    Subjective:    Pain:  [x] Yes  [] No Location: R shoulder  Pain Rating: (0-10 scale) 3/10  Pain altered Tx:  [x] No  [] Yes  Action:  Comments:  Patient arrives noting soreness in shoulder, however states lower pain rating. Notes he tried to do some light lifting, \"light duty things\", at work today but didn't go well.     Objective:  Modalities: Vasopneumatic cryotherapy device (GameReady) to R shoulder at low pressure and 38 degrees x 15 min--patient declined need  Precautions:  Exercises:  Exercise Reps/ Time Weight/ Level Comments   Pulleys 2 min      UBE   2/2 min            Standing      Towel slides 10x2\"  Flexion, abduction, circles   Scap retraction 10x3\" Tband ext 10x Lime    Tband tricep 10x Lime                Seated      Table slides w/ball   20x ea green Flexion, abduction, side to side circles    ER stretch on table   10x5\"  Red wedge with 2 pillows on high mat table   Bicep curls   20x 4 lb    Shrugs   20x     Pronation supination    20x 2 lb          Supine      Cane press    20x 1lb    Cane flexion     10x 1lb     Cane abduction 10x 1lb    Cane horizontal abduction      Cane ER 10x 1lb    Other:    Manual Therapy: PROM to R shoulder with gentle distraction in all planes of motion x 10 minutes. Treatment Charges: Mins Units    []  Modalities      [x]  Ther Exercise 50 3 400-450   []  Manual Therapy      []  Ther Activities      []  Aquatics      []  Vasocompression      []  Other      Total Treatment time 50 mins 3         Assessment: [x] Progressing toward goals. Progressed with light theraband strengthening as noted with overall fair tolerance. Patient continues to note pain with all planes and active motion, but tolerable to complete all reps. Improved tolerance to PROM this date with increased relaxation, however pain at end range stretch. [] No change. [] Other:  [x] Patient would continue to benefit from skilled physical therapy services in order to: Patient will benefit from from physical therapy to restore R shoulder ROM, strength, and function to return to full duty work. STG/LTG  STG: (to be met in 18 treatments)  1. ? Pain:2/10 R shoulder pain with over head activity. 2. ? ROM: R shoulder flexion to 150, abduction to 90, ER and IR to 60 degrees to improve reaching. 3. ? Strength: Patient is able to lift and carry 50 lb box without Right shoulder pain. 4. ? Function: UEFI score to 75% functional to return to full duty work. 5. Independent with Home Exercise Programs      Pt.  Education:  [x] Yes  [] No  [x] Reviewed Prior HEP/Ed  Method of Education: [x] Verbal  [x] Demo  [] Written  Comprehension of Education:  [x] Verbalizes understanding. [x] Demonstrates understanding. [] Needs review. [x] Demonstrates/verbalizes HEP/Ed previously given. Plan: [x] Continue current frequency toward long and short term goals. [x] Specific Instructions for subsequent treatments: progress as able  Rehab Protocol   PHASE I:  1. Apply modalities as needed (heat, ice, electrotherapy, etc.)  2. Perform range of motion exercises (passive, active-assistive) avoid abduction, extension and external rotation (cocked position for throwing). o Rope and Pulley  o Wand  o Finger Walk  3. Posterior cuff stretch in supine (cross arm adduction)  4. Manual stretching avoiding stretching to the anterior capsule (ER in the scapular plane and no shoulder extension)  5. Functional behind the back stretch (IR towel stretch) if needed  6. Mobilization of posterior cuff if needed  7. Elastic resistance for IR/ER with arm at side and elbow at 90 degrees (pain free ROM with ER): and scapular strengthening (shrugs, rows, etc.)  8. UBE  9. DB exercises for:  o Supraspinatus - full can in the scapular plane below shoulder level. o Shoulder flexion  o Shoulder abduction (pain free)  o Shoulder extension in prone (do not move the shoulder past the plane of the body)  o Shoulder rows in prone  o Serratus punch in supine (push up plus program)  o Shoulder shrugs  o Forearm/elbow strengthening  10. Rhythmic stabilization exercises (begin in the supine position progressing to the functional planes of motion)  11. PNF patterns with gentle manual resistance (progress by working into the dysfunctional plane of motion)  PHASE II:  1. Continue posterior cuff stretching  2. Continue shoulder strengthening exercises with free weights and elastic resistance (emphasize eccentric work on the rotator cuff, progress planes of motion to the 90/90 position)  3. Add lower trap pull downs with pulley system if available  4.  Progress prone DB program by adding:  o horizontal

## 2021-06-07 ENCOUNTER — HOSPITAL ENCOUNTER (OUTPATIENT)
Dept: PHYSICAL THERAPY | Age: 56
Setting detail: THERAPIES SERIES
Discharge: HOME | End: 2021-06-07
Payer: COMMERCIAL

## 2021-06-07 PROCEDURE — 97110 THERAPEUTIC EXERCISES: CPT

## 2021-06-07 NOTE — FLOWSHEET NOTE
Table slides w/ball   20x ea green Flexion, abduction, side to side circles    ER stretch on table   10x5\"  Red wedge with 2 pillows on high mat table   Bicep curls   20x 4 lb    Shrugs   20x     Pronation supination    20x 2 lb    Cane press 20x 1 lb     Cane flexion  20x 1 lb     AROM ER 20x           Supine      Cane press    20x 2lb    AROM flexion     20x 2lb     AROM abduction 15x     AROM horizontal abduction 15x     AROM ER 15x           Sidelying      Abduction  15x     ER 15x           Other:    Manual Therapy: PROM to R shoulder with gentle distraction in all planes of motion x 10 minutes. Treatment Charges: Mins Units Time in/ out   []  Modalities      [x]  Ther Exercise 45 3 [id number]   []  Manual Therapy      []  Ther Activities      []  Aquatics      []  Vasocompression      []  Other      Total Treatment time 45 mins 3         Assessment: [x] Progressing toward goals. Initiated AROM exercises with fair tolerance. [] No change. [x] Other: Pain is still increasing with ROM > 90 degrees. [x] Patient would continue to benefit from skilled physical therapy services in order to: Patient will benefit from from physical therapy to restore R shoulder ROM, strength, and function to return to full duty work. STG/LTG  STG: (to be met in 18 treatments)  1. ? Pain:2/10 R shoulder pain with over head activity. 2. ? ROM: R shoulder flexion to 150, abduction to 90, ER and IR to 60 degrees to improve reaching. 3. ? Strength: Patient is able to lift and carry 50 lb box without Right shoulder pain. 4. ? Function: UEFI score to 75% functional to return to full duty work. 5. Independent with Home Exercise Programs      Pt. Education:  [x] Yes  [] No  [x] Reviewed Prior HEP/Ed  Method of Education: [x] Verbal  [x] Demo  [] Written  Comprehension of Education:  [x] Verbalizes understanding. [x] Demonstrates understanding. [] Needs review.   [x] Demonstrates/verbalizes HEP/Ed previously given.     Plan: [x] Continue current frequency toward long and short term goals. [x] Specific Instructions for subsequent treatments: progress as able  Rehab Protocol   PHASE I:  1. Apply modalities as needed (heat, ice, electrotherapy, etc.)  2. Perform range of motion exercises (passive, active-assistive) avoid abduction, extension and external rotation (cocked position for throwing). o Rope and Pulley  o Wand  o Finger Walk  3. Posterior cuff stretch in supine (cross arm adduction)  4. Manual stretching avoiding stretching to the anterior capsule (ER in the scapular plane and no shoulder extension)  5. Functional behind the back stretch (IR towel stretch) if needed  6. Mobilization of posterior cuff if needed  7. Elastic resistance for IR/ER with arm at side and elbow at 90 degrees (pain free ROM with ER): and scapular strengthening (shrugs, rows, etc.)  8. UBE  9. DB exercises for:  o Supraspinatus - full can in the scapular plane below shoulder level. o Shoulder flexion  o Shoulder abduction (pain free)  o Shoulder extension in prone (do not move the shoulder past the plane of the body)  o Shoulder rows in prone  o Serratus punch in supine (push up plus program)  o Shoulder shrugs  o Forearm/elbow strengthening  10. Rhythmic stabilization exercises (begin in the supine position progressing to the functional planes of motion)  11. PNF patterns with gentle manual resistance (progress by working into the dysfunctional plane of motion)  PHASE II:  1. Continue posterior cuff stretching  2. Continue shoulder strengthening exercises with free weights and elastic resistance (emphasize eccentric work on the rotator cuff, progress planes of motion to the 90/90 position)  3. Add lower trap pull downs with pulley system if available  4. Progress prone DB program by adding:  o horizontal abduction  o retraction with ER  o extension with palm forward  5.  Plyotoss chest pass (progress to overhead and single arm)  6. Progress push plus program (wall push ups, modified floor, floor)  7. Begin progressive throwing program as advised by MD  8. Begin total body conditioning including a well organized core stability program for overhead athletes  9. Begin skill development at a low intensity level  10. Continue with rhythmic stabilization exercises with resistance and in the functional planes of motion. 11. Continue PNF patterns  12. Utilize manual resisted techniques or elastic resistance to emphasize eccentric loading for the posterior cuff. PHASE III:  Focus on progressive exercises in preparation for returning to the prior activity level. 1. Continue flexibility/mobility exercises  2. Continue progressive throwing program  3. Continue with strengthening  4. May add overhead strengthening ( press)  5. May progress to bench program.  o Regular  o Incline  o Decline  6. Continue UBE  7. Continue total body conditioning  8. Progress skill development. Begin practicing skills specific to the activity (work, recreational activity, sport, etc.).         Time In: 3:55 pm          Time Out: 4:40 pm    Electronically signed by:  Dalton Murillo PT

## 2021-06-09 ENCOUNTER — HOSPITAL ENCOUNTER (OUTPATIENT)
Dept: PHYSICAL THERAPY | Age: 56
Setting detail: THERAPIES SERIES
Discharge: HOME | End: 2021-06-09
Payer: COMMERCIAL

## 2021-06-09 PROCEDURE — 97110 THERAPEUTIC EXERCISES: CPT

## 2021-06-09 NOTE — FLOWSHEET NOTE
[x] [facility]) [facility] &  Therapy  [address]  [phone]  F: [phone] [] [address]  [address]   [address]  P: [phone]  F: [phone] [] [place]  [address]  P: [phone]  F: [phone] [] [address]  P: [phone]  F: [phone] [] [address]  [facility]   [address]   [place]: [phone]  F: [phone]      Physical Therapy Daily Treatment Note    Date:  2021  Patient Name:  Kika Payan    :  1965  MRN: [id number]  Physician: Isidra Riojas DO                     Insurance: [facility] 18 visits 21-21  Medical Diagnosis: Right shoulder pain, unspecified chronicity (M25.511 [ICD-10-CM]); Fall from ladder, subsequent encounter (W11. XXXD [ICD-10-CM]); Anterior dislocation of right humerus, subsequent encounter (S43.014D [ICD-10-CM])                          Rehab Codes: M25.511, M25.611  Onset Date: 21                                Next 's appt: 21  Visits:     Cancels/No Shows: 0/0    Subjective:    Pain:  [x] Yes  [] No Location: R shoulder  Pain Rating: (0-10 scale) 4/10  Pain altered Tx:  [x] No  [] Yes  Action:  Comments:  Patient is doing fair still experiencing shoulder limitations.    Objective:  Modalities: Vasopneumatic cryotherapy device (GameReady) to R shoulder at low pressure and 38 degrees x 15 min--patient declined need  Precautions:  Exercises:  Exercise Reps/ Time Weight/ Level Comments   Pulleys 2 min      UBE   2/2 min            Standing      Towel slides 20x  Flexion, abduction, circles   Scap retraction      Tband Row  15x Lime     Tband ext 20x Lime    Tband tricep 20x Lime    Tband Biceps  20x Lime     T band ER 15x lime subsequent treatments: progress as able  Rehab Protocol   PHASE I:  1. Apply modalities as needed (heat, ice, electrotherapy, etc.)  2. Perform range of motion exercises (passive, active-assistive) avoid abduction, extension and external rotation (cocked position for throwing). o Rope and Pulley  o Wand  o Finger Walk  3. Posterior cuff stretch in supine (cross arm adduction)  4. Manual stretching avoiding stretching to the anterior capsule (ER in the scapular plane and no shoulder extension)  5. Functional behind the back stretch (IR towel stretch) if needed  6. Mobilization of posterior cuff if needed  7. Elastic resistance for IR/ER with arm at side and elbow at 90 degrees (pain free ROM with ER): and scapular strengthening (shrugs, rows, etc.)  8. UBE  9. DB exercises for:  o Supraspinatus - full can in the scapular plane below shoulder level. o Shoulder flexion  o Shoulder abduction (pain free)  o Shoulder extension in prone (do not move the shoulder past the plane of the body)  o Shoulder rows in prone  o Serratus punch in supine (push up plus program)  o Shoulder shrugs  o Forearm/elbow strengthening  10. Rhythmic stabilization exercises (begin in the supine position progressing to the functional planes of motion)  11. PNF patterns with gentle manual resistance (progress by working into the dysfunctional plane of motion)  PHASE II:  1. Continue posterior cuff stretching  2. Continue shoulder strengthening exercises with free weights and elastic resistance (emphasize eccentric work on the rotator cuff, progress planes of motion to the 90/90 position)  3. Add lower trap pull downs with pulley system if available  4. Progress prone DB program by adding:  o horizontal abduction  o retraction with ER  o extension with palm forward  5. Plyotoss chest pass (progress to overhead and single arm)  6. Progress push plus program (wall push ups, modified floor, floor)  7.  Begin progressive throwing program as advised by MD  8. Begin total body conditioning including a well organized core stability program for overhead athletes  9. Begin skill development at a low intensity level  10. Continue with rhythmic stabilization exercises with resistance and in the functional planes of motion. 11. Continue PNF patterns  12. Utilize manual resisted techniques or elastic resistance to emphasize eccentric loading for the posterior cuff. PHASE III:  Focus on progressive exercises in preparation for returning to the prior activity level. 1. Continue flexibility/mobility exercises  2. Continue progressive throwing program  3. Continue with strengthening  4. May add overhead strengthening ( press)  5. May progress to bench program.  o Regular  o Incline  o Decline  6. Continue UBE  7. Continue total body conditioning  8. Progress skill development. Begin practicing skills specific to the activity (work, recreational activity, sport, etc.).         Time In: 3:50 pm          Time Out: 4:30 pm    Electronically signed by:  Key Gunter PT

## 2021-06-11 ENCOUNTER — HOSPITAL ENCOUNTER (OUTPATIENT)
Dept: PHYSICAL THERAPY | Age: 56
Setting detail: THERAPIES SERIES
Discharge: HOME | End: 2021-06-11
Payer: COMMERCIAL

## 2021-06-11 PROCEDURE — 97110 THERAPEUTIC EXERCISES: CPT

## 2021-06-11 NOTE — FLOWSHEET NOTE
[x] [facility]  [facility]  [address]  [phone]  F: [phone] [] [address]  [place] [address]   [address]  P: [phone]  F: [phone] [] [place]  [address]  P: [phone]  F: [phone] [] [address]  P: [phone]  F: [phone] [] [address]  [facility]   [address]   [place]: [phone]  F: [phone]      Physical Therapy Daily Treatment Note    Date:  2021  Patient Name:  Raquel Heard    :  1965  MRN: [id number]  Physician: Nichol Diaz DO                     Insurance: [facility] 18 visits 21-21  Medical Diagnosis: Right shoulder pain, unspecified chronicity (M25.511 [ICD-10-CM]); Fall from ladder, subsequent encounter (W11. XXXD [ICD-10-CM]); Anterior dislocation of right humerus, subsequent encounter (S43.014D [ICD-10-CM])                          Rehab Codes: M25.511, M25.611  Onset Date: 21                                Next 's appt: 21  Visits:     Cancels/No Shows: 0/0    Subjective:    Pain:  [x] Yes  [] No Location: R shoulder  Pain Rating: (0-10 scale) 3/10  Pain altered Tx:  [x] No  [] Yes  Action:  Comments:  Patient states his shoulder was very sore after last session so he soaked it when he got home. States continued pain but he's getting through.     Objective:  Modalities: Vasopneumatic cryotherapy device (GameReady) to R shoulder at low pressure and 38 degrees x 15 min--patient declined need  Precautions:  Exercises:  Exercise Reps/ Time Weight/ Level Comments   Pulleys 2 min      UBE   2/2 min            Standing      Towel slides 20x  Flexion, abduction, circles   Scap retraction      Tband Row  15x Lime     Tband ext 20x Demonstrates/verbalizes HEP/Ed previously given. Plan: [x] Continue current frequency toward long and short term goals. [x] Specific Instructions for subsequent treatments: progress as able  Rehab Protocol   PHASE I:  1. Apply modalities as needed (heat, ice, electrotherapy, etc.)  2. Perform range of motion exercises (passive, active-assistive) avoid abduction, extension and external rotation (cocked position for throwing). o Rope and Pulley  o Wand  o Finger Walk  3. Posterior cuff stretch in supine (cross arm adduction)  4. Manual stretching avoiding stretching to the anterior capsule (ER in the scapular plane and no shoulder extension)  5. Functional behind the back stretch (IR towel stretch) if needed  6. Mobilization of posterior cuff if needed  7. Elastic resistance for IR/ER with arm at side and elbow at 90 degrees (pain free ROM with ER): and scapular strengthening (shrugs, rows, etc.)  8. UBE  9. DB exercises for:  o Supraspinatus - full can in the scapular plane below shoulder level. o Shoulder flexion  o Shoulder abduction (pain free)  o Shoulder extension in prone (do not move the shoulder past the plane of the body)  o Shoulder rows in prone  o Serratus punch in supine (push up plus program)  o Shoulder shrugs  o Forearm/elbow strengthening  10. Rhythmic stabilization exercises (begin in the supine position progressing to the functional planes of motion)  11. PNF patterns with gentle manual resistance (progress by working into the dysfunctional plane of motion)  PHASE II:  1. Continue posterior cuff stretching  2. Continue shoulder strengthening exercises with free weights and elastic resistance (emphasize eccentric work on the rotator cuff, progress planes of motion to the 90/90 position)  3. Add lower trap pull downs with pulley system if available  4. Progress prone DB program by adding:  o horizontal abduction  o retraction with ER  o extension with palm forward  5.  Plyotoss chest pass (progress to overhead and single arm)  6. Progress push plus program (wall push ups, modified floor, floor)  7. Begin progressive throwing program as advised by MD  8. Begin total body conditioning including a well organized core stability program for overhead athletes  9. Begin skill development at a low intensity level  10. Continue with rhythmic stabilization exercises with resistance and in the functional planes of motion. 11. Continue PNF patterns  12. Utilize manual resisted techniques or elastic resistance to emphasize eccentric loading for the posterior cuff. PHASE III:  Focus on progressive exercises in preparation for returning to the prior activity level. 1. Continue flexibility/mobility exercises  2. Continue progressive throwing program  3. Continue with strengthening  4. May add overhead strengthening ( press)  5. May progress to bench program.  o Regular  o Incline  o Decline  6. Continue UBE  7. Continue total body conditioning  8. Progress skill development. Begin practicing skills specific to the activity (work, recreational activity, sport, etc.).         Time In: 3:50 pm          Time Out: 4:40 pm    Electronically signed by:  Jethro Helm PTA

## 2021-06-14 ENCOUNTER — HOSPITAL ENCOUNTER (OUTPATIENT)
Dept: PHYSICAL THERAPY | Age: 56
Setting detail: THERAPIES SERIES
End: 2021-06-14
Payer: COMMERCIAL

## 2021-06-16 ENCOUNTER — HOSPITAL ENCOUNTER (OUTPATIENT)
Dept: PHYSICAL THERAPY | Age: 56
Setting detail: THERAPIES SERIES
Discharge: HOME | End: 2021-06-16
Payer: COMMERCIAL

## 2021-06-16 PROCEDURE — 97110 THERAPEUTIC EXERCISES: CPT

## 2021-06-16 NOTE — FLOWSHEET NOTE
[x] [facility] CENTER &  Therapy  [address]  [phone]  F: [phone] [] [address]  [facility]   [address]  P: [phone]  F: [phone] [] [address]  [address]  P: [phone]  F: [phone] [] [address]  P: [phone]  F: [phone] [] [address]  [facility]   [address]   [place]: [phone]  F: [phone]      Physical Therapy Daily Treatment Note    Date:  2021  Patient Name:  Olga Greene    :  1965  MRN: [id number]  Physician: Davida Mei DO                     Insurance: [facility] 18 visits 21-21  Medical Diagnosis: Right shoulder pain, unspecified chronicity (M25.511 [ICD-10-CM]); Fall from ladder, subsequent encounter (W11. XXXD [ICD-10-CM]);  Anterior dislocation of right humerus, subsequent encounter (S43.014D [ICD-10-CM])                          Rehab Codes: M25.511, M25.611  Onset Date: 21                                Next 's appt: 21  Visits: 10/18    Cancels/No Shows: 0/0    Subjective:    Pain:  [x] Yes  [] No Location: R shoulder  Pain Rating: (0-10 scale) 410  Pain altered Tx:  [x] No  [] Yes  Action:  Comments:  Patient states pain has improved, but never gone, still can not reach over head or perform any work     Objective:  Modalities: Vasopneumatic cryotherapy device (GameReady) to R shoulder at low pressure and 38 degrees x 15 min--patient declined need  Precautions:  Exercises:  Exercise Reps/ Time Weight/ Level Comments   Pulleys 2 min      UBE   2/2 min  L2          Standing      Towel slides 20x  Flexion, abduction, circles   Scap retraction      Tband Row  20x Lime     Tband ext 20x Lime    Tband tricep 20x Lime    Tband Biceps  20x Lime     T band ER 20x lime    Tband IR  20x lime    Bicep curls 20x 4lb    Shrugs 20x 4lb                Seated      Pronation supination    20x 4 lb    Flexion  10x AROM  difficult    Abduction  15x  With elbow flexed   Chest press  15x     AROM ER 20x  Scapular retraction         Supine      Chest  press    20x 2 lb    AROM flexion     15x 2 lb     AROM abduction 15x 2 lb    AROM horizontal abduction 15x     AROM ER 15x           Sidelying      Abduction  15x     ER 15x           Other:    Manual Therapy: PROM to R shoulder with gentle distraction in all planes of motion x 5 minutes. Held    Treatment Charges: Mins Units Time in/ out   []  Modalities      [x]  Ther Exercise 30 2 [id number]   []  Manual Therapy      []  Ther Activities      []  Aquatics      []  Vasocompression      []  Other      Total Treatment time 30 mins 2        Assessment: [x] Progressing toward goals. Patient is making very slow progress with PT. Active shoulder flexion is still extremely weak and limited. Recommend further evaluation and imaging. [] No change. [] Other:    [x] Patient would continue to benefit from skilled physical therapy services in order to: Patient will benefit from from physical therapy to restore R shoulder ROM, strength, and function to return to full duty work. STG/LTG  STG: (to be met in 18 treatments)  1. ? Pain:2/10 R shoulder pain with over head activity. 2. ? ROM: R shoulder flexion to 150, abduction to 90, ER and IR to 60 degrees to improve reaching. 3. ? Strength: Patient is able to lift and carry 50 lb box without Right shoulder pain. 4. ? Function: UEFI score to 75% functional to return to full duty work. 5. Independent with Home Exercise Programs      Pt. Education:  [x] Yes  [] No  [x] Reviewed Prior HEP/Ed  Method of Education: [x] Verbal  [x] Demo  [] Written  Comprehension of Education:  [x] Verbalizes understanding. [x] Demonstrates understanding.   [] Needs review. [x] Demonstrates/verbalizes HEP/Ed previously given. Plan: [x] Continue current frequency toward long and short term goals. [x] Specific Instructions for subsequent treatments: progress as able  Rehab Protocol   PHASE I:  1. Apply modalities as needed (heat, ice, electrotherapy, etc.)  2. Perform range of motion exercises (passive, active-assistive) avoid abduction, extension and external rotation (cocked position for throwing). o Rope and Pulley  o Wand  o Finger Walk  3. Posterior cuff stretch in supine (cross arm adduction)  4. Manual stretching avoiding stretching to the anterior capsule (ER in the scapular plane and no shoulder extension)  5. Functional behind the back stretch (IR towel stretch) if needed  6. Mobilization of posterior cuff if needed  7. Elastic resistance for IR/ER with arm at side and elbow at 90 degrees (pain free ROM with ER): and scapular strengthening (shrugs, rows, etc.)  8. UBE  9. DB exercises for:  o Supraspinatus - full can in the scapular plane below shoulder level. o Shoulder flexion  o Shoulder abduction (pain free)  o Shoulder extension in prone (do not move the shoulder past the plane of the body)  o Shoulder rows in prone  o Serratus punch in supine (push up plus program)  o Shoulder shrugs  o Forearm/elbow strengthening  10. Rhythmic stabilization exercises (begin in the supine position progressing to the functional planes of motion)  11. PNF patterns with gentle manual resistance (progress by working into the dysfunctional plane of motion)  PHASE II:  1. Continue posterior cuff stretching  2. Continue shoulder strengthening exercises with free weights and elastic resistance (emphasize eccentric work on the rotator cuff, progress planes of motion to the 90/90 position)  3. Add lower trap pull downs with pulley system if available  4.  Progress prone DB program by adding:  o horizontal abduction  o retraction with ER  o extension with palm forward  5. Plyotoss chest pass (progress to overhead and single arm)  6. Progress push plus program (wall push ups, modified floor, floor)  7. Begin progressive throwing program as advised by MD  8. Begin total body conditioning including a well organized core stability program for overhead athletes  9. Begin skill development at a low intensity level  10. Continue with rhythmic stabilization exercises with resistance and in the functional planes of motion. 11. Continue PNF patterns  12. Utilize manual resisted techniques or elastic resistance to emphasize eccentric loading for the posterior cuff. PHASE III:  Focus on progressive exercises in preparation for returning to the prior activity level. 1. Continue flexibility/mobility exercises  2. Continue progressive throwing program  3. Continue with strengthening  4. May add overhead strengthening ( press)  5. May progress to bench program.  o Regular  o Incline  o Decline  6. Continue UBE  7. Continue total body conditioning  8. Progress skill development. Begin practicing skills specific to the activity (work, recreational activity, sport, etc.).       Time In: 4:00 pm          Time Out: 4:30 pm    Electronically signed by:  Paul Muro PT

## 2021-06-18 ENCOUNTER — OFFICE VISIT (OUTPATIENT)
Dept: ORTHOPEDIC SURGERY | Age: 56
End: 2021-06-18
Payer: COMMERCIAL

## 2021-06-18 ENCOUNTER — HOSPITAL ENCOUNTER (OUTPATIENT)
Dept: PHYSICAL THERAPY | Age: 56
Setting detail: THERAPIES SERIES
Discharge: HOME | End: 2021-06-18
Payer: COMMERCIAL

## 2021-06-18 VITALS — BODY MASS INDEX: 44.1 KG/M2 | WEIGHT: 315 LBS | HEIGHT: 71 IN

## 2021-06-18 DIAGNOSIS — S43.014D ANTERIOR DISLOCATION OF RIGHT HUMERUS, SUBSEQUENT ENCOUNTER: ICD-10-CM
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
## 2021-06-18 PROCEDURE — 97110 THERAPEUTIC EXERCISES: CPT

## 2021-06-18 PROCEDURE — 99214 OFFICE O/P EST MOD 30 MIN: CPT | Performed by: ORTHOPAEDIC SURGERY

## 2021-06-18 ASSESSMENT — ENCOUNTER SYMPTOMS
VOMITING: 0
COUGH: 0
ABDOMINAL PAIN: 0
APNEA: 0
CHEST TIGHTNESS: 0

## 2021-06-18 NOTE — FLOWSHEET NOTE
[x] [facility] &  Therapy  [address]  [phone]  F: [phone] [] [address]  [address]   [address]  P: [phone]  F: [phone] [] [place]  [address]  P: [phone]  F: [phone] [] [address]  P: [phone]  F: [phone] [] [address]  [facility]   [address]   [place]: [phone]  F: [phone]      Physical Therapy Daily Treatment Note    Date:  2021  Patient Name:  Ankit Lowery    :  1965  MRN: [id number]  Physician: Sheng Montemayor DO                     Insurance: [address] [facility] 18 visits 21-21  Medical Diagnosis: Right shoulder pain, unspecified chronicity (M25.511 [ICD-10-CM]); Fall from ladder, subsequent encounter ([address]. XXXD [ICD-10-CM]); Anterior dislocation of right humerus, subsequent encounter (S43.014D [ICD-10-CM])                          Rehab Codes: M25.511, M25.611  Onset Date: 21                                Next 's appt: 21  Visits:     Cancels/No Shows: 0/0    Subjective:    Pain:  [x] Yes  [] No Location: R shoulder  Pain Rating: (0-10 scale) 8/10  Pain altered Tx:  [x] No  [] Yes  Action:  Comments:  Patient arrives stating he saw referring physician this date and reports doctor told him to trial overhead reaching and lifting restriction up to 10lb. No further imaging or scans ordered at this time. Follows up with referring physician again in August. Reports he did not get to take meds this morning and with rain/weather today pain is significantly higher at 8/10.      Objective:  Modalities: Vasopneumatic cryotherapy device (GameReady) to R shoulder at low pressure and 38 degrees x 15 min--patient declined need  Precautions:  Exercises:  Exercise Reps/ Time Weight/ Level Comments   Pulleys 2 min      UBE   2/2 min  L2          Standing      Towel slides 20x  Flexion, abduction, circles   Scap retraction      Tband Row  20x Lime     Tband ext 20x Lime    Tband tricep 20x Lime    Tband Biceps  20x Lime     T band jarrett ER 20x lime    Tband IR  20x lime    Bicep curls 20x 4lb    Shrugs 20x 4lb                Prone   [employer] - Level 35   Retraction 10x2\"     Ext 10x     Rows 10x           Seated      Pronation supination    20x 4 lb    Flexion  10x AROM  difficult    Abduction  15x  With elbow flexed   Chest press  15x 2lb    OH press 15x AROM    AROM ER   Scapular retraction         Supine      Chest  press    20x 2 lb     FW flexion     15x 2 lb     AROM abduction 15x 2 lb    AROM horizontal abduction      AROM ER            Sidelying      Abduction  2x10     ER 2x10           Other:    Manual Therapy: PROM to R shoulder with gentle distraction in all planes of motion x 5 minutes. Held    Treatment Charges: Mins Units Time in/ out   []  Modalities      [x]  Ther Exercise 40 3 350-430   []  Manual Therapy      []  Ther Activities      []  Aquatics      []  Vasocompression      []  Other      Total Treatment time 40 min 3        Assessment: [x] Progressing toward goals. Progressed to prone exs at [employer] as well as added resistance to various seated exs with fair tolerance. Patient continues to note pain with all active and resisted exercises, but verbalizes ROM has improved. [] No change. [] Other:    [x] Patient would continue to benefit from skilled physical therapy services in order to: Patient will benefit from from physical therapy to restore R shoulder ROM, strength, and function to return to full duty work. STG/LTG  STG: (to be met in 18 treatments)  1. ? Pain:2/10 R shoulder pain with over head activity.    2. ? ROM: R shoulder flexion to 150, abduction to 90, ER and IR to 60 degrees to improve reaching. 3. ? Strength: Patient is able to lift and carry 50 lb box without Right shoulder pain. 4. ? Function: UEFI score to 75% functional to return to full duty work. 5. Independent with Home Exercise Programs      Pt. Education:  [x] Yes  [] No  [x] Reviewed Prior HEP/Ed  Method of Education: [x] Verbal  [x] Demo  [] Written  Comprehension of Education:  [x] Verbalizes understanding. [x] Demonstrates understanding. [] Needs review. [x] Demonstrates/verbalizes HEP/Ed previously given. Plan: [x] Continue current frequency toward long and short term goals. [x] Specific Instructions for subsequent treatments: progress as able  Rehab Protocol   PHASE I:  1. Apply modalities as needed (heat, ice, electrotherapy, etc.)  2. Perform range of motion exercises (passive, active-assistive) avoid abduction, extension and external rotation (cocked position for throwing). o Rope and Pulley  o Wand  o Finger Walk  3. Posterior cuff stretch in supine (cross arm adduction)  4. Manual stretching avoiding stretching to the anterior capsule (ER in the scapular plane and no shoulder extension)  5. Functional behind the back stretch (IR towel stretch) if needed  6. Mobilization of posterior cuff if needed  7. Elastic resistance for IR/ER with arm at side and elbow at 90 degrees (pain free ROM with ER): and scapular strengthening (shrugs, rows, etc.)  8. UBE  9. DB exercises for:  o Supraspinatus - full can in the scapular plane below shoulder level. o Shoulder flexion  o Shoulder abduction (pain free)  o Shoulder extension in prone (do not move the shoulder past the plane of the body)  o Shoulder rows in prone  o Serratus punch in supine (push up plus program)  o Shoulder shrugs  o Forearm/elbow strengthening  10. Rhythmic stabilization exercises (begin in the supine position progressing to the functional planes of motion)  11.  PNF patterns with gentle manual resistance (progress by working into the dysfunctional plane of motion)  PHASE II:  1. Continue posterior cuff stretching  2. Continue shoulder strengthening exercises with free weights and elastic resistance (emphasize eccentric work on the rotator cuff, progress planes of motion to the 90/90 position)  3. Add lower trap pull downs with pulley system if available  4. Progress prone DB program by adding:  o horizontal abduction  o retraction with ER  o extension with palm forward  5. Plyotoss chest pass (progress to overhead and single arm)  6. Progress push plus program (wall push ups, modified floor, floor)  7. Begin progressive throwing program as advised by MD  8. Begin total body conditioning including a well organized core stability program for overhead athletes  9. Begin skill development at a low intensity level  10. Continue with rhythmic stabilization exercises with resistance and in the functional planes of motion. 11. Continue PNF patterns  12. Utilize manual resisted techniques or elastic resistance to emphasize eccentric loading for the posterior cuff. PHASE III:  Focus on progressive exercises in preparation for returning to the prior activity level. 1. Continue flexibility/mobility exercises  2. Continue progressive throwing program  3. Continue with strengthening  4. May add overhead strengthening ( press)  5. May progress to bench program.  o Regular  o Incline  o Decline  6. Continue UBE  7. Continue total body conditioning  8. Progress skill development. Begin practicing skills specific to the activity (work, recreational activity, sport, etc.).       Time In: 350 pm          Time Out: 4:30 pm    Electronically signed by:  Jovita Zhang PTA

## 2021-06-18 NOTE — LETTER
[facility]  [address] [facility] [address] [facility]  Phone: [phone]  Fax: Stewart Vasquez DO        June 18, 2021     Patient: Lukas Hernandez   YOB: 1965   Date of Visit: 6/18/2021       To Whom it May Concern:    Sunitha Ulrich was seen in my clinic on 6/18/2021. He may return to work on 6/19/21 with restrictions of limited overhead work, no lifting more than 10 pounds and is okay to drive. If you have any questions or concerns, please don't hesitate to call.     Sincerely,     Tomasa Tripp,

## 2021-06-18 NOTE — PROGRESS NOTES
[facility]  [facility] ORTHO SPECIALISTS  [address] [facility] [address]  Dept: [phone]  Dept Fax: [phone]        Ambulatory Follow Up      Subjective:   Kika Payan is a 50y.o. year old male who presents to our office today for routine followup regarding his   1. Fall from ladder, subsequent encounter    2. Anterior dislocation of right humerus, subsequent encounter    . Chief Complaint   Patient presents with    Shoulder Pain     [facility] - RT SHOULDER INJURY - 04/06/2021       HPI- Patient is in the office today in follow up for right shoulder dislocation after a work related injury sustained on 4/6/21. The patient mentions that he is slowly getting better. Patient is completing formal therapy and it is going well. He has been to 6 to 9 sessions of therapy to date. He feels like he gets sharp pains at times that make his should feel weak. The patient has been back to work with restrictions of no overhead work and no lifting more than 5 pounds. Patient is okay to drive. He feels that is able to complete his job safely with these restrictions. Review of Systems   Constitutional: Negative for chills and fever. Respiratory: Negative for apnea, cough and chest tightness. Cardiovascular: Negative for chest pain and palpitations. Gastrointestinal: Negative for abdominal pain and vomiting. Genitourinary: Negative for difficulty urinating. Musculoskeletal: Positive for arthralgias (right shoulder). Negative for gait problem, joint swelling and myalgias. Neurological: Negative for dizziness, weakness and numbness. I have reviewed the CC, HPI, ROS, PMH, FHX, Social History, and if not present in this note, I have reviewed in the patient's chart. I agree with the documentation provided by other staff and have reviewed their documentation prior to providing my signature indicating agreement.     Objective :   Ht 5' 11\" (1.803 m)   Wt (!) 363 lb (164.7 kg)   BMI 50.63 kg/m²  Body mass index is 50.63 kg/m². General: Yoeltte Saavedra is a 50 y.o. male who is alert and oriented and sitting comfortably in our office. Ortho Exam  MS:  F=140 degrees right shoulder, FZ=456 right shoulder. Good ER strength right shoulder. Significant outward deformity of the right shoulder is appreciated. Motor, sensory, vascular examination of the right upper extremity is grossly intact without focal deficits. Neuro: alert and oriented to person and place. Eyes: Extra-ocular muscles intact  Mouth: Oral mucosa moist. No perioral lesions  Pulm: Respirations unlabored and regular. Symmetric chest excursion without outward deformity is noted. Skin: warm, well perfused  Psych:   Patient has good fund of knowledge and displays understanging of exam, diagnosis, and plan. Radiology:     No results found. Assessment:      1. Fall from ladder, subsequent encounter    2. Anterior dislocation of right humerus, subsequent encounter       Plan:      Discussed with the patient that his pain is still normal at this time due to the type of injury he had. Continue formal therapy to work on strengthening of the shoulder. Discussed with the patient that I would like to change his restrictions some to allow him to lift more weight. I feel that this will help provide him more therapy while at work. His restrictions are no lifting more than 10 pounds, no overhead work, and is okay to drive. SAP form will be sent to [facility] when office note is completed. The patient should continue to rehab for the next 6 weeks then follow up in the office in 7 weeks. The patient knows to call with any questions or concerns. Follow up:Return in about 7 weeks (around 8/6/2021). No orders of the defined types were placed in this encounter. No orders of the defined types were placed in this encounter.     Katharina Pal LPN am scribing for and in the presence of Dr. Lalit Gordon  6/20/2021 1:58 PM I have reviewed and made changes accordingly to the work scribed by Gonzalo Oneill LPN. The documentation accurately reflects work and decisions made by me. I have also reviewed documentation completed by clinical staff.     Mason Smalls, DO, [facility]  6/20/2021 1:59 PM    This note is created with the assistance of a speech recognition program.  While intending to generate a document that actually reflects the content of the visit, the document can still have some errors including those of syntax and sound a like substitutions which may escape proof reading.  In such instances, actual meaning can be extrapolated by contextual diversion      Electronically signed by Becki Macdonald on 6/20/2021 at 1:58 PM

## 2021-06-21 ENCOUNTER — HOSPITAL ENCOUNTER (OUTPATIENT)
Dept: PHYSICAL THERAPY | Age: 56
Setting detail: THERAPIES SERIES
Discharge: HOME | End: 2021-06-21
Payer: COMMERCIAL

## 2021-06-21 NOTE — FLOWSHEET NOTE
[x] [facility]) [facility] &  Therapy  [address]    [phone]  F: [phone]   [] [address]  [facility]   [address]  P: [phone]  F: [phone]  [] [address] &  Therapy  [address]  P: [phone]  F: [phone] [] [address]  P: [phone]  F: [phone]  [] [address]  [address]   [place]: [phone]  F: [phone]   [] [facility]  [address]  [place]: [phone]   F: [phone]     Physical Therapy Cancel/No Show note    Date: 2021  Patient: Bernardo Seth  : 1965  MRN: [id number]    Cancels/No Shows to date: 0    For today's appointment patient:    [x]  Cancelled    [] Rescheduled appointment    [] No-show     Reason given by patient:    [x]  Patient ill     []  Conflicting appointment    [] No transportation      [] Conflict with work    [] No reason given    [] Weather related    [] [id number]    [] Other:      Comments:        [x] Next appointment was confirmed    Electronically signed by: Julio Velásquez PTA

## 2021-06-23 ENCOUNTER — HOSPITAL ENCOUNTER (OUTPATIENT)
Dept: PHYSICAL THERAPY | Age: 56
Setting detail: THERAPIES SERIES
Discharge: HOME | End: 2021-06-23
Payer: COMMERCIAL

## 2021-06-23 PROCEDURE — 97110 THERAPEUTIC EXERCISES: CPT

## 2021-06-23 NOTE — FLOWSHEET NOTE
[x] [facility]) [facility] &  Therapy  [address]  [phone]  F: [phone] [] [address]  [place] [address]   [address]  P: [phone]  F: [phone] [] [place]  [address]  P: [phone]  F: [phone] [] [address]  P: [phone]  F: [phone] [] [address]  [facility]   [address]   [place]: [phone]  F: [phone]      Physical Therapy Daily Treatment Note    Date:  2021  Patient Name:  Va Conteh    :  1965  MRN: [id number]  Physician: Saroj Conde DO                     Insurance: [address] [facility] 18 visits 21-21  Medical Diagnosis: Right shoulder pain, unspecified chronicity (M25.511 [ICD-10-CM]); Fall from ladder, subsequent encounter (W11. XXXD [ICD-10-CM]); Anterior dislocation of right humerus, subsequent encounter (S43.014D [ICD-10-CM])                          Rehab Codes: M25.511, M25.611  Onset Date: 21                                Next 's appt: 21  Visits:     Cancels/No Shows: 0/0    Subjective:    Pain:  [x] Yes  [] No Location: R shoulder  Pain Rating: (0-10 scale) 5/10  Pain altered Tx:  [x] No  [] Yes  Action:  Comments:  Patient arrives with no new complaints this date. He reports compliance with HEP. Is ready for his shoulder to have less pain. Patient stressed about the day he had at work.      Objective:  Modalities: Vasopneumatic cryotherapy device (GameReady) to R shoulder at low pressure and 38 degrees x 15 min--patient declined need stating he can ice at home   Precautions: None  Exercises:  Exercise Reps/ Time Weight/ Level Comments   Pulleys 2 min      UBE   2/2 min  L2          Standing      Towel slides 15x  Flexion, abduction, circles   Scap retraction      Tband Row  20x Lime     Tband ext 20x Lime    Tband tricep 20x Lime    Tband Biceps  20x Lime     T band jarrett ER 20x lime    Tband IR  20x lime    TBand Horiz ABD  10x Lime     Bicep curls 15x 5lb    Shrugs 15x 5lb                Prone   Total Gym - Level 35   Retraction 10x2\"     Ext 15x     Rows 15x     Horiz ABD  10x            Seated      Pronation supination    20x 4 lb    Flexion  10x AROM  difficult    Abduction    With elbow flexed   Chest press       OH press 15x AROM    AROM ER   Scapular retraction         Supine      Chest  press    20x 1 lb     FW flexion     15x 1 lb     AROM abduction      AROM horizontal abduction      AROM ER      Rhythmic Stabilization with shoulder at 90 degrees flex  2x30\"            Sidelying      Abduction       ER            Other:    Manual Therapy: PROM to R shoulder with gentle distraction in all planes of motion x 5 minutes  Direct inhibition muscle release to R subscapularis muscle with shoulder resting in approx 120 degrees scaption     R shoulder ROM after PROM and manual 6/23/21  Flex: 154 degrees  Abd: 153 degrees  Scaption: 165 degrees       Treatment Charges: Mins Units Time in/ out   []  Modalities      [x]  Ther Exercise 40 3 355-430   [x]  Manual Therapy 5 -- 430-435   []  Ther Activities      []  Aquatics      []  Vasocompression      []  Other      Total Treatment time 45 min 3        Assessment: [x] Progressing toward goals. Progressed to prone exs at [employer] as well as added resistance to various seated exs with fair tolerance. Patient continues to note pain with all active and resisted exercises. Patient guarding during passive motion, but improved over time. Ended session with PROM in spine. Addressed decreased R shoulder ROM with manual muscle release to the subscapularis. Patient had increased pain during exercises, but reports that it went down towards the end of the session. [] No change.       [x] Other:  R shoulder scapular plane below shoulder level. o Shoulder flexion  o Shoulder abduction (pain free)  o Shoulder extension in prone (do not move the shoulder past the plane of the body)  o Shoulder rows in prone  o Serratus punch in supine (push up plus program)  o Shoulder shrugs  o Forearm/elbow strengthening  10. Rhythmic stabilization exercises (begin in the supine position progressing to the functional planes of motion)  11. PNF patterns with gentle manual resistance (progress by working into the dysfunctional plane of motion)  PHASE II:  1. Continue posterior cuff stretching  2. Continue shoulder strengthening exercises with free weights and elastic resistance (emphasize eccentric work on the rotator cuff, progress planes of motion to the 90/90 position)  3. Add lower trap pull downs with pulley system if available  4. Progress prone DB program by adding:  o horizontal abduction  o retraction with ER  o extension with palm forward  5. Plyotoss chest pass (progress to overhead and single arm)  6. Progress push plus program (wall push ups, modified floor, floor)  7. Begin progressive throwing program as advised by MD  8. Begin total body conditioning including a well organized core stability program for overhead athletes  9. Begin skill development at a low intensity level  10. Continue with rhythmic stabilization exercises with resistance and in the functional planes of motion. 11. Continue PNF patterns  12. Utilize manual resisted techniques or elastic resistance to emphasize eccentric loading for the posterior cuff. PHASE III:  Focus on progressive exercises in preparation for returning to the prior activity level. 1. Continue flexibility/mobility exercises  2. Continue progressive throwing program  3. Continue with strengthening  4. May add overhead strengthening ( press)  5. May progress to bench program.  o Regular  o Incline  o Decline  6. Continue UBE  7. Continue total body conditioning  8.  Progress skill development. Begin practicing skills specific to the activity (work, recreational activity, sport, etc.).       Time In: 355 pm          Time Out: 4:35 pm    Electronically signed by:  Tab Portillo PT

## 2021-06-25 ENCOUNTER — HOSPITAL ENCOUNTER (OUTPATIENT)
Dept: PHYSICAL THERAPY | Age: 56
Setting detail: THERAPIES SERIES
Discharge: HOME | End: 2021-06-25
Payer: COMMERCIAL

## 2021-06-25 NOTE — FLOWSHEET NOTE
[x] [facility]) - [facility] &  Therapy  [address]    [phone]  F: [phone]   [] [address]  [facility]   [address]  P: [phone]  F: [phone]  [] [address] &  Therapy  [address] [facility] [address]  P: [phone]  F: [phone] [] [address]  P: [phone]  F: [phone]  [] [address]  [facility]   [address]   [place]: [phone]  F: [phone]   [] [place] [address] [facility] [address]  [place]: [phone]   F: [phone]     Physical Therapy Cancel/No Show note    Date: 2021  Patient: Raquel Heard  : 1965  MRN: [id number]    Cancels/No Shows to date: 30    For today's appointment patient:    [x]  Cancelled    [] Rescheduled appointment    [] No-show     Reason given by patient:    []  Patient ill     []  Conflicting appointment    [] No transportation      [] Conflict with work    [] No reason given    [] Weather related    [] COVID-19    [x] Other:      Comments:  Pt has a flat tire this date.        [x] Next appointment was confirmed    Electronically signed by: Feng Burgos PTA

## 2021-06-28 ENCOUNTER — HOSPITAL ENCOUNTER (OUTPATIENT)
Dept: PHYSICAL THERAPY | Age: 56
Setting detail: THERAPIES SERIES
Discharge: HOME | End: 2021-06-28
Payer: COMMERCIAL

## 2021-06-28 PROCEDURE — 97110 THERAPEUTIC EXERCISES: CPT

## 2021-06-28 NOTE — FLOWSHEET NOTE
[x] [facility]) - [facility] &  Therapy  [address]  [phone]  F: [phone] [] [address]  [place] [address]   [address]  P: [phone]  F: [phone] [] [place]  [address]  P: [phone]  F: [phone] [] [address]  P: [phone]  F: [phone] [] [address]  [facility]   [address]   [place]: [phone]  F: [phone]      Physical Therapy Daily Treatment Note    Date:  2021  Patient Name:  Va Conteh    :  1965  MRN: [id number]  Physician: Saroj Conde DO                     Insurance: [facility] 18 visits 21-21  Medical Diagnosis: Right shoulder pain, unspecified chronicity (M25.511 [ICD-10-CM]); Fall from ladder, subsequent encounter (W11. XXXD [ICD-10-CM]); Anterior dislocation of right humerus, subsequent encounter (S43.014D [ICD-10-CM])                          Rehab Codes: M25.511, M25.611  Onset Date: 21                                Next 's appt: 21  Visits:     Cancels/No Shows: 0/0    Subjective:    Pain:  [x] Yes  [] No Location: R shoulder  Pain Rating: (0-10 scale) 5/10  Pain altered Tx:  [x] No  [] Yes  Action:  Comments:  Patient still reporting 5/10 pain. States his pain was worse after manual therapy last session.      Objective:  Modalities: Vasopneumatic cryotherapy device (GameReady) to R shoulder at low pressure and 38 degrees x 15 min--patient declined need stating he can ice at home   Precautions: None  Exercises:  Exercise Reps/ Time Weight/ Level Comments   Pulleys 2 min      UBE   2/2 min  L2          Standing      Towel slides 15x  Flexion, abduction, circles   Scap retraction      Tband Row  20x Lime     Tband ext 20x Lime Tband tricep 20x Lime    Tband Biceps  20x Lime     T band jarrett ER 20x lime    Tband IR  20x lime    TBand Horiz ABD  10x Lime     Bicep curls 15x 5lb    Shrugs 15x 5lb                Prone   Total Gym - Level 35 HELD   Retraction 10x2\"     Ext 15x     Rows 15x     Horiz ABD  10x            Seated      Pronation supination    20x 4 lb    Flexion  20x AROM  difficult    Abduction  20x  With elbow flexed   Chest press  20x     OH press 15x AROM    AROM ER 20x  Scapular retraction         Supine      Chest  press    20x 1 lb     FW flexion     15x 1 lb     abduction 20x 1 lb    AROM horizontal abduction 20x 1 lb    AROM ER 20x 1 lb    Rhythmic Stabilization with shoulder at 90 degrees flex             Sidelying      Abduction  20x 1 lb    ER 20x 1 lb          Other:    Manual Therapy: Held    R shoulder ROM after PROM and manual 6/23/21  Flex: 154 degrees  Abd: 153 degrees   Scaption: 165 degrees       Treatment Charges: Mins Units Time in/ out   []  Modalities      [x]  Ther Exercise 45 3 4:00-4:45   [x]  Manual Therapy  --    []  Ther Activities      []  Aquatics      []  Vasocompression      []  Other      Total Treatment time 45 min 3        Assessment: [x] Progressing toward goals. Patient's AROM is slowly improving. His R shoulder strength is still very limited. [] No change. [x] Other:    [x] Patient would continue to benefit from skilled physical therapy services in order to: Patient will benefit from from physical therapy to restore R shoulder ROM, strength, and function to return to full duty work. STG/LTG  STG: (to be met in 18 treatments)  1. ? Pain:2/10 R shoulder pain with over head activity. 2. ? ROM: R shoulder flexion to 150, abduction to 90, ER and IR to 60 degrees to improve reaching. 3. ? Strength: Patient is able to lift and carry 50 lb box without Right shoulder pain. 4. ? Function: UEFI score to 75% functional to return to full duty work.    5. Independent with Home Exercise Programs      Pt. Education:  [x] Yes  [] No  [x] Reviewed Prior HEP/Ed    Postural education provided throughout session. Method of Education: [x] Verbal  [x] Demo  [] Written  Comprehension of Education:  [x] Verbalizes understanding. [x] Demonstrates understanding. [] Needs review. [x] Demonstrates/verbalizes HEP/Ed previously given. Plan: [x] Continue current frequency toward long and short term goals. [x] Specific Instructions for subsequent treatments: progress as able  Rehab Protocol   PHASE I:  1. Apply modalities as needed (heat, ice, electrotherapy, etc.)  2. Perform range of motion exercises (passive, active-assistive) avoid abduction, extension and external rotation (cocked position for throwing). o Rope and Pulley  o Wand  o Finger Walk  3. Posterior cuff stretch in supine (cross arm adduction)  4. Manual stretching avoiding stretching to the anterior capsule (ER in the scapular plane and no shoulder extension)  5. Functional behind the back stretch (IR towel stretch) if needed  6. Mobilization of posterior cuff if needed  7. Elastic resistance for IR/ER with arm at side and elbow at 90 degrees (pain free ROM with ER): and scapular strengthening (shrugs, rows, etc.)  8. UBE  9. DB exercises for:  o Supraspinatus - full can in the scapular plane below shoulder level. o Shoulder flexion  o Shoulder abduction (pain free)  o Shoulder extension in prone (do not move the shoulder past the plane of the body)  o Shoulder rows in prone  o Serratus punch in supine (push up plus program)  o Shoulder shrugs  o Forearm/elbow strengthening  10. Rhythmic stabilization exercises (begin in the supine position progressing to the functional planes of motion)  11. PNF patterns with gentle manual resistance (progress by working into the dysfunctional plane of motion)  PHASE II:  1. Continue posterior cuff stretching  2.  Continue shoulder strengthening exercises with free weights and elastic resistance (emphasize eccentric work on the rotator cuff, progress planes of motion to the 90/90 position)  3. Add lower trap pull downs with pulley system if available  4. Progress prone DB program by adding:  o horizontal abduction  o retraction with ER  o extension with palm forward  5. Plyotoss chest pass (progress to overhead and single arm)  6. Progress push plus program (wall push ups, modified floor, floor)  7. Begin progressive throwing program as advised by MD  8. Begin total body conditioning including a well organized core stability program for overhead athletes  9. Begin skill development at a low intensity level  10. Continue with rhythmic stabilization exercises with resistance and in the functional planes of motion. 11. Continue PNF patterns  12. Utilize manual resisted techniques or elastic resistance to emphasize eccentric loading for the posterior cuff. PHASE III:  Focus on progressive exercises in preparation for returning to the prior activity level. 1. Continue flexibility/mobility exercises  2. Continue progressive throwing program  3. Continue with strengthening  4. May add overhead strengthening ( press)  5. May progress to bench program.  o Regular  o Incline  o Decline  6. Continue UBE  7. Continue total body conditioning  8. Progress skill development. Begin practicing skills specific to the activity (work, recreational activity, sport, etc.).       Time In: 4:00 pm          Time Out: 4:45 pm    Electronically signed by:  Ben Erazo PT

## 2021-06-30 ENCOUNTER — HOSPITAL ENCOUNTER (OUTPATIENT)
Dept: PHYSICAL THERAPY | Age: 56
Setting detail: THERAPIES SERIES
Discharge: HOME | End: 2021-06-30
Payer: COMMERCIAL

## 2021-06-30 PROCEDURE — 97110 THERAPEUTIC EXERCISES: CPT

## 2021-06-30 NOTE — FLOWSHEET NOTE
[x] [facility]) - [facility] CENTER &  Therapy  [address]  [phone]  F: [phone] [] [address]  [place] [address]   [address]  P: [phone]  F: [phone] [] [place]  [address]  P: [phone]  F: [phone] [] [address]  P: [phone]  F: [phone] [] [address]  [facility]   [address]   [place]: [phone]  F: [phone]      Physical Therapy Daily Treatment Note    Date:  2021  Patient Name:  Rio Xavier    :  1965  MRN: [id number]  Physician: Nemo Peralta DO                     Insurance: [facility] 18 visits 21-21 extended to 21  Medical Diagnosis: Right shoulder pain, unspecified chronicity (M25.511 [ICD-10-CM]); Fall from ladder, subsequent encounter (W11. XXXD [ICD-10-CM]); Anterior dislocation of right humerus, subsequent encounter (S43.014D [ICD-10-CM])                          Rehab Codes: M25.511, M25.611  Onset Date: 21                                Next 's appt: 21  Visits:     Cancels/No Shows: 0/0    Subjective:    Pain:  [x] Yes  [] No Location: R shoulder  Pain Rating: (0-10 scale) 5/10  Pain altered Tx:  [x] No  [] Yes  Action:  Comments:  Still reporting consistent pain in R shoulder and his knees.      Objective:  Modalities: Vasopneumatic cryotherapy device (GameReady) to R shoulder at low pressure and 38 degrees x 15 min--patient declined need stating he can ice at home   Precautions: None  Exercises:  Exercise Reps/ Time Weight/ Level Comments   Pulleys 2 min      UBE   2/2 min  L2          Standing      Towel slides 15x  Flexion, abduction, circles   Scap retraction      Tband Row  20x blue    Tband ext 20x blue    Tband tricep 20x blue    Tband Biceps  20x blue    T band jarrett ER 20x blue    Tband IR  20x blue    TBand Horiz ABD  x Lime     Bicep curls 15x 6lb    Shrugs 15x 6lb                Prone   Total Gym - Level 35 HELD   Retraction 10x2\"     Ext 15x     Rows 15x     Horiz ABD  10x            Seated      Pronation supination    20x 6 lb    Flexion  20x AROM  difficult    Abduction  20x 1 lb With elbow flexed   Chest press  20x 1 lb     OH press 15x AROM    AROM ER 20x 1 lb Scapular retraction         Supine      Chest  press    20x 1 lb     FW flexion     20x 1 lb     abduction 20x 1 lb    AROM horizontal abduction 20x 1 lb    AROM ER 20x 1 lb    Rhythmic Stabilization with shoulder at 90 degrees flex             Sidelying      Abduction  20x 1 lb    ER 20x 1 lb          Other:    Manual Therapy: Held    R shoulder ROM after PROM and manual 6/23/21  Flex: 154 degrees  Abd: 153 degrees   Scaption: 165 degrees       Treatment Charges: Mins Units Time in/ out   []  Modalities      [x]  Ther Exercise 45 3 4:00-4:45   [x]  Manual Therapy  --    []  Ther Activities      []  Aquatics      []  Vasocompression      []  Other      Total Treatment time 45 min 3        Assessment: [x] Progressing toward goals. Patient was able to tolerate increased resistance band during standing exercise today with fair tolerance. [] No change. [x] Other:  He still lacks full over head motion and limited ER. [x] Patient would continue to benefit from skilled physical therapy services in order to: Patient will benefit from from physical therapy to restore R shoulder ROM, strength, and function to return to full duty work. STG/LTG  STG: (to be met in 18 treatments)  1. ? Pain:2/10 R shoulder pain with over head activity. 2. ? ROM: R shoulder flexion to 150, abduction to 90, ER and IR to 60 degrees to improve reaching. 3. ? Strength: Patient is able to lift and carry 50 lb box without Right shoulder pain. 4. ?  Function: UEFI score to 75% functional to return to full duty work. 5. Independent with Home Exercise Programs      Pt. Education:  [x] Yes  [] No  [x] Reviewed Prior HEP/Ed    Postural education provided throughout session. Method of Education: [x] Verbal  [x] Demo  [] Written  Comprehension of Education:  [x] Verbalizes understanding. [x] Demonstrates understanding. [] Needs review. [x] Demonstrates/verbalizes HEP/Ed previously given. Plan: [x] Continue current frequency toward long and short term goals. [x] Specific Instructions for subsequent treatments: progress as able  Rehab Protocol   PHASE I:  1. Apply modalities as needed (heat, ice, electrotherapy, etc.)  2. Perform range of motion exercises (passive, active-assistive) avoid abduction, extension and external rotation (cocked position for throwing). o Rope and Pulley  o Wand  o Finger Walk  3. Posterior cuff stretch in supine (cross arm adduction)  4. Manual stretching avoiding stretching to the anterior capsule (ER in the scapular plane and no shoulder extension)  5. Functional behind the back stretch (IR towel stretch) if needed  6. Mobilization of posterior cuff if needed  7. Elastic resistance for IR/ER with arm at side and elbow at 90 degrees (pain free ROM with ER): and scapular strengthening (shrugs, rows, etc.)  8. UBE  9. DB exercises for:  o Supraspinatus - full can in the scapular plane below shoulder level. o Shoulder flexion  o Shoulder abduction (pain free)  o Shoulder extension in prone (do not move the shoulder past the plane of the body)  o Shoulder rows in prone  o Serratus punch in supine (push up plus program)  o Shoulder shrugs  o Forearm/elbow strengthening  10. Rhythmic stabilization exercises (begin in the supine position progressing to the functional planes of motion)  11. PNF patterns with gentle manual resistance (progress by working into the dysfunctional plane of motion)  PHASE II:  1. Continue posterior cuff stretching  2.  Continue shoulder strengthening exercises with free weights and elastic resistance (emphasize eccentric work on the rotator cuff, progress planes of motion to the 90/90 position)  3. Add lower trap pull downs with pulley system if available  4. Progress prone DB program by adding:  o horizontal abduction  o retraction with ER  o extension with palm forward  5. Plyotoss chest pass (progress to overhead and single arm)  6. Progress push plus program (wall push ups, modified floor, floor)  7. Begin progressive throwing program as advised by MD  8. Begin total body conditioning including a well organized core stability program for overhead athletes  9. Begin skill development at a low intensity level  10. Continue with rhythmic stabilization exercises with resistance and in the functional planes of motion. 11. Continue PNF patterns  12. Utilize manual resisted techniques or elastic resistance to emphasize eccentric loading for the posterior cuff. PHASE III:  Focus on progressive exercises in preparation for returning to the prior activity level. 1. Continue flexibility/mobility exercises  2. Continue progressive throwing program  3. Continue with strengthening  4. May add overhead strengthening ( press)  5. May progress to bench program.  o Regular  o Incline  o Decline  6. Continue UBE  7. Continue total body conditioning  8. Progress skill development. Begin practicing skills specific to the activity (work, recreational activity, sport, etc.).       Time In: 4:00 pm          Time Out: 4:45 pm    Electronically signed by:  Morelia Robertson PT

## 2021-07-07 ENCOUNTER — HOSPITAL ENCOUNTER (OUTPATIENT)
Dept: PHYSICAL THERAPY | Age: 56
Setting detail: THERAPIES SERIES
Discharge: HOME | End: 2021-07-07
Payer: COMMERCIAL

## 2021-07-07 PROCEDURE — 97110 THERAPEUTIC EXERCISES: CPT

## 2021-07-07 NOTE — FLOWSHEET NOTE
[x] [facility]) [facility] CENTER &  Therapy  [address]  [phone]  F: [phone] [] [address]  [place] [address]   [address]  P: [phone]  F: [phone] [] [place]  [address]  P: [phone]  F: [phone] [] [email or web address]  P: [phone]  F: [phone] [] [address]  [facility]   [address]   [place]: [phone]  F: [phone]      Physical Therapy Daily Treatment Note    Date:  2021  Patient Name:  Zahida Hanna    :  1965  MRN: [id number]  Physician: Cassie Lake DO                     Insurance: [facility] 18 visits 21-21 extended to 21  Medical Diagnosis: Right shoulder pain, unspecified chronicity (M25.511 [ICD-10-CM]); Fall from ladder, subsequent encounter (W11. XXXD [ICD-10-CM]); Anterior dislocation of right humerus, subsequent encounter (S43.014D [ICD-10-CM])                          Rehab Codes: M25.511, M25.611  Onset Date: 21                                Next 's appt: 21  Visits: 15/18    Cancels/No Shows: 0/0    Subjective:    Pain:  [x] Yes  [] No Location: R shoulder  Pain Rating: (0-10 scale) 5/10  Pain altered Tx:  [x] No  [] Yes  Action:  Comments:  Still reporting consistent pain in R shoulder and his knees, increased secondary to rainy weather this date. Patient reports that he had a new sharp pain when moving his arm into IR to scratch his back.  Has not had this pain in the past.     Objective:  Modalities: Vasopneumatic cryotherapy device (GameReady) to R shoulder at low pressure and 38 degrees x 15 min--patient declined need stating he can ice at home   Precautions: None  Exercises:  Exercise Reps/ Time Weight/ Level Comments   Pulleys 2 min UBE   2/2 min  L2          Standing      Towel slides 15x  Flexion, abduction, circles   Scap retraction      Tband Row  20x blue    Tband ext 20x blue    Tband tricep 20x blue    Tband Biceps  20x blue    T band jarrett ER 20x blue    Tband IR  20x blue    TBand Horiz ABD  x Blue      Bicep curls 15x 6lb    Shrugs 15x 6lb    Wand Flexion    In mirror for visual feedback on scapular elevation          Prone   Total Gym - Level 35 HELD    Retraction      Ext      Rows      Horiz ABD             Seated      Pronation supination    20x 6 lb    Flexion     in standing this date    Abduction    With elbow flexed   Chest press       OH press      AROM ER   Scapular retraction         Supine      Chest  press    20x 2 lb     FW flexion     20x 2 lb     abduction 20x 2 lb    AROM horizontal abduction 20x 2 lb    AROM ER 20x 2 lb    Rhythmic Stabilization with shoulder at 90 degrees flex             Sidelying      Abduction  20x 2 lb    ER 20x 2 lb          Other:    Manual Therapy: Held    R shoulder ROM after PROM and manual 6/23/21  Flex: 154 degrees  Abd: 153 degrees   Scaption: 165 degrees       Treatment Charges: Mins Units Time in/ out   []  Modalities      [x]  Ther Exercise 42 3 4:00-4:42   [x]  Manual Therapy  --    []  Ther Activities      []  Aquatics      []  Vasocompression      []  Other      Total Treatment time 42 min 3        Assessment: [x] Progressing toward goals. Patient was able to tolerate increased weight during supine and slidelying exercises this date without increase in pain. Patient reports stiffness after session, does not rate on pain rating scale. Defers ice for post-exercise soreness this date. [] No change. [x] Other:  He still lacks full over head motion and limited ER.    [x] Patient would continue to benefit from skilled physical therapy services in order to: Patient will benefit from from physical therapy to restore R shoulder ROM, strength, and function to return to full duty work.     STG/LTG  STG: (to be met in 18 treatments)  1. ? Pain:2/10 R shoulder pain with over head activity. 2. ? ROM: R shoulder flexion to 150, abduction to 90, ER and IR to 60 degrees to improve reaching. 3. ? Strength: Patient is able to lift and carry 50 lb box without Right shoulder pain. 4. ? Function: UEFI score to 75% functional to return to full duty work. 5. Independent with Home Exercise Programs      Pt. Education:  [x] Yes  [] No  [x] Reviewed Prior HEP/Ed  Postural education provided throughout session. Decrease upper trap activation with exercises. Method of Education: [x] Verbal  [x] Demo  [] Written  Comprehension of Education:  [x] Verbalizes understanding. [x] Demonstrates understanding. [] Needs review. [x] Demonstrates/verbalizes HEP/Ed previously given. Plan: [x] Continue current frequency toward long and short term goals. [x] Specific Instructions for subsequent treatments: progress as able  Rehab Protocol   PHASE I:  1. Apply modalities as needed (heat, ice, electrotherapy, etc.)  2. Perform range of motion exercises (passive, active-assistive) avoid abduction, extension and external rotation (cocked position for throwing). o Rope and Pulley  o Wand  o Finger Walk  3. Posterior cuff stretch in supine (cross arm adduction)  4. Manual stretching avoiding stretching to the anterior capsule (ER in the scapular plane and no shoulder extension)  5. Functional behind the back stretch (IR towel stretch) if needed  6. Mobilization of posterior cuff if needed  7. Elastic resistance for IR/ER with arm at side and elbow at 90 degrees (pain free ROM with ER): and scapular strengthening (shrugs, rows, etc.)  8. UBE  9. DB exercises for:  o Supraspinatus - full can in the scapular plane below shoulder level.   o Shoulder flexion  o Shoulder abduction (pain free)  o Shoulder extension in prone (do not move the shoulder past the plane of the body)  o Shoulder rows in prone  o Serratus punch in supine (push up plus program)  o Shoulder shrugs  o Forearm/elbow strengthening  10. Rhythmic stabilization exercises (begin in the supine position progressing to the functional planes of motion)  11. PNF patterns with gentle manual resistance (progress by working into the dysfunctional plane of motion)  PHASE II:  1. Continue posterior cuff stretching  2. Continue shoulder strengthening exercises with free weights and elastic resistance (emphasize eccentric work on the rotator cuff, progress planes of motion to the 90/90 position)  3. Add lower trap pull downs with pulley system if available  4. Progress prone DB program by adding:  o horizontal abduction  o retraction with ER  o extension with palm forward  5. Plyotoss chest pass (progress to overhead and single arm)  6. Progress push plus program (wall push ups, modified floor, floor)  7. Begin progressive throwing program as advised by MD  8. Begin total body conditioning including a well organized core stability program for overhead athletes  9. Begin skill development at a low intensity level  10. Continue with rhythmic stabilization exercises with resistance and in the functional planes of motion. 11. Continue PNF patterns  12. Utilize manual resisted techniques or elastic resistance to emphasize eccentric loading for the posterior cuff. PHASE III:  Focus on progressive exercises in preparation for returning to the prior activity level. 1. Continue flexibility/mobility exercises  2. Continue progressive throwing program  3. Continue with strengthening  4. May add overhead strengthening ( press)  5. May progress to bench program.  o Regular  o Incline  o Decline  6. Continue UBE  7. Continue total body conditioning  8. Progress skill development. Begin practicing skills specific to the activity (work, recreational activity, sport, etc.).       Time In: 4:00 pm          Time Out: 4:45 pm    Electronically signed by:  [email or web address] Dain Kayser

## 2021-07-12 ENCOUNTER — HOSPITAL ENCOUNTER (OUTPATIENT)
Dept: PHYSICAL THERAPY | Age: 56
Setting detail: THERAPIES SERIES
Discharge: HOME | End: 2021-07-12
Payer: COMMERCIAL

## 2021-07-12 PROCEDURE — 97110 THERAPEUTIC EXERCISES: CPT

## 2021-07-12 NOTE — FLOWSHEET NOTE
[x] [facility]) - [facility] CENTER &  Therapy  [address]  [phone]  F: [phone] [] [address]  [place] [address]   [address]  P: [phone]  F: [phone] [] [place]  [address]  P: [phone]  F: [phone] [] [address]  P: [phone]  F: [phone] [] [address]  [facility]   [address]   [place]: [phone]  F: [phone]      Physical Therapy Daily Treatment Note    Date:  2021  Patient Name:  Clayton Chavez    :  1965  MRN: [id number]  Physician: Milagros Muñoz DO                     Insurance: [facility] 18 visits 21-21 extended to 21  Medical Diagnosis: Right shoulder pain, unspecified chronicity (M25.511 [ICD-10-CM]); Fall from ladder, subsequent encounter (W11. XXXD [ICD-10-CM]); Anterior dislocation of right humerus, subsequent encounter (S43.014D [ICD-10-CM])                          Rehab Codes: M25.511, M25.611  Onset Date: 21                                Next 's appt: 21  Visits:     Cancels/No Shows: 0/0    Subjective:    Pain:  [x] Yes  [] No Location: R shoulder  Pain Rating: (0-10 scale) 6/10  Pain altered Tx:  [x] No  [] Yes  Action:  Comments: Patient reports his R shoulder pain is slightly higher today due to the weather. Patient has difficulty reaching behind back.      Objective:  Modalities: Vasopneumatic cryotherapy device (GameReady) to R shoulder at low pressure and 38 degrees x 15 min--patient declined need stating he can ice at home   Precautions: None  Exercises:  Exercise Reps/ Time Weight/ Level Comments   Pulleys 2 min      UBE   2/2 min  L2          Standing      Towel slides 15x  Flexion, abduction, circles   Scap retraction Tband Row  20x blue    Tband ext 20x blue    Tband tricep 20x blue    Tband Biceps  20x blue    T band jarrett ER 20x blue    Tband IR  20x blue    TBand Horiz ABD  x Blue      Bicep curls 15x 6lb    Shrugs 15x 6lb    Wand Flexion    In mirror for visual feedback on scapular elevation          Prone   Total Gym - Level 35 HELD    Retraction      Ext      Rows      Horiz ABD             Seated      Pronation supination    20x 6 lb    Flexion  20x 2 lb  in standing this date    Abduction    With elbow flexed   Chest press  20x 2 lb    OH press 20x 2 lb    AROM ER   Scapular retraction         Supine      Chest  press    20x 2 lb     FW flexion     20x 2 lb     abduction 20x 2 lb    AROM horizontal abduction 20x 2 lb    AROM ER 20x 2 lb    Rhythmic Stabilization with shoulder at 90 degrees flex             Sidelying      Abduction  20x 2 lb    ER 20x 2 lb          Other:    Manual Therapy: Held    R shoulder ROM after PROM and manual 6/23/21  Flex: 154 degrees  Abd: 153 degrees   Scaption: 165 degrees       Treatment Charges: Mins Units Time in/ out   []  Modalities      [x]  Ther Exercise 40 3 11:00-11:40 am   [x]  Manual Therapy  --    []  Ther Activities      []  Aquatics      []  Vasocompression      []  Other      Total Treatment time 40 min 3        Assessment: [x] Progressing toward goals. Patient's AROM is improved but he still reports pain with all ER activity. [] No change. [] Other:    [x] Patient would continue to benefit from skilled physical therapy services in order to: Patient will benefit from from physical therapy to restore R shoulder ROM, strength, and function to return to full duty work. STG/LTG  STG: (to be met in 18 treatments)  1. ? Pain:2/10 R shoulder pain with over head activity. 2. ? ROM: R shoulder flexion to 150, abduction to 90, ER and IR to 60 degrees to improve reaching. 3. ? Strength: Patient is able to lift and carry 50 lb box without Right shoulder pain. 4. ? Function: UEFI score to 75% functional to return to full duty work. 5. Independent with Home Exercise Programs      Pt. Education:  [x] Yes  [] No  [x] Reviewed Prior HEP/Ed  Postural education provided throughout session. Decrease upper trap activation with exercises. Method of Education: [x] Verbal  [x] Demo  [] Written  Comprehension of Education:  [x] Verbalizes understanding. [x] Demonstrates understanding. [] Needs review. [x] Demonstrates/verbalizes HEP/Ed previously given. Plan: [x] Continue current frequency toward long and short term goals. [x] Specific Instructions for subsequent treatments: progress as able  Rehab Protocol   PHASE I:  1. Apply modalities as needed (heat, ice, electrotherapy, etc.)  2. Perform range of motion exercises (passive, active-assistive) avoid abduction, extension and external rotation (cocked position for throwing). o Rope and Pulley  o Wand  o Finger Walk  3. Posterior cuff stretch in supine (cross arm adduction)  4. Manual stretching avoiding stretching to the anterior capsule (ER in the scapular plane and no shoulder extension)  5. Functional behind the back stretch (IR towel stretch) if needed  6. Mobilization of posterior cuff if needed  7. Elastic resistance for IR/ER with arm at side and elbow at 90 degrees (pain free ROM with ER): and scapular strengthening (shrugs, rows, etc.)  8. UBE  9. DB exercises for:  o Supraspinatus - full can in the scapular plane below shoulder level. o Shoulder flexion  o Shoulder abduction (pain free)  o Shoulder extension in prone (do not move the shoulder past the plane of the body)  o Shoulder rows in prone  o Serratus punch in supine (push up plus program)  o Shoulder shrugs  o Forearm/elbow strengthening  10. Rhythmic stabilization exercises (begin in the supine position progressing to the functional planes of motion)  11.  PNF patterns with gentle manual resistance (progress by working into the dysfunctional plane of motion)  PHASE II:  1. Continue posterior cuff stretching  2. Continue shoulder strengthening exercises with free weights and elastic resistance (emphasize eccentric work on the rotator cuff, progress planes of motion to the 90/90 position)  3. Add lower trap pull downs with pulley system if available  4. Progress prone DB program by adding:  o horizontal abduction  o retraction with ER  o extension with palm forward  5. Plyotoss chest pass (progress to overhead and single arm)  6. Progress push plus program (wall push ups, modified floor, floor)  7. Begin progressive throwing program as advised by MD  8. Begin total body conditioning including a well organized core stability program for overhead athletes  9. Begin skill development at a low intensity level  10. Continue with rhythmic stabilization exercises with resistance and in the functional planes of motion. 11. Continue PNF patterns  12. Utilize manual resisted techniques or elastic resistance to emphasize eccentric loading for the posterior cuff. PHASE III:  Focus on progressive exercises in preparation for returning to the prior activity level. 1. Continue flexibility/mobility exercises  2. Continue progressive throwing program  3. Continue with strengthening  4. May add overhead strengthening ( press)  5. May progress to bench program.  o Regular  o Incline  o Decline  6. Continue UBE  7. Continue total body conditioning  8. Progress skill development. Begin practicing skills specific to the activity (work, recreational activity, sport, etc.).       Time In: 11:00 am          Time Out: 11:40 am    Electronically signed by:  Abdoulaye Owen PT

## 2021-07-14 ENCOUNTER — HOSPITAL ENCOUNTER (OUTPATIENT)
Dept: PHYSICAL THERAPY | Age: 56
Setting detail: THERAPIES SERIES
Discharge: HOME | End: 2021-07-14
Payer: COMMERCIAL

## 2021-07-14 PROCEDURE — 97110 THERAPEUTIC EXERCISES: CPT

## 2021-07-14 NOTE — FLOWSHEET NOTE
[x] [facility]) [facility] &  Therapy  [address]  [phone]  F: [phone] [] [address]  [facility]   [address]  P: [phone]  F: [phone] [] [address]  [address]  P: [phone]  F: [phone] [] [address]  P: [phone]  F: [phone] [] [address]  [facility]   [address]   Harinder Head[place]: [phone]  F: [phone]      Physical Therapy Daily Treatment Note    Date:  2021  Patient Name:  Abbey Ozuna    :  1965  MRN: [id number]  Physician: Ani Arana DO                     Insurance: [facility] 18 visits 21-21 extended to 21  Medical Diagnosis: Right shoulder pain, unspecified chronicity (M25.511 [ICD-10-CM]); Fall from ladder, subsequent encounter (W11. XXXD [ICD-10-CM]); Anterior dislocation of right humerus, subsequent encounter (S43.014D [ICD-10-CM])                          Rehab Codes: M25.511, M25.611  Onset Date: 21                                Next 's appt: 21  Visits:     Cancels/No Shows: 0/0    Subjective:    Pain:  [x] Yes  [] No Location: R shoulder  Pain Rating: (0-10 scale) 5/10  Pain altered Tx:  [x] No  [] Yes  Action:  Comments: Patient notes frustration with continued shoulder pain.     Objective:  Modalities: Vasopneumatic cryotherapy device (GameReady) to R shoulder at low pressure and 38 degrees x 15 min--patient declined need stating he can ice at home   Precautions: None  Exercises:  Exercise Reps/ Time Weight/ Level Comments   Pulleys 2 min      UBE   2/2 min  L2          Standing      Towel slides 15x  Flexion, abduction, circles   Scap retraction      Tband Row  20x blue    Tband ext 20x blue    Tband tricep 20x blue Tband Biceps  20x blue    T band jarrett ER 20x blue    Tband IR  20x blue    TBand Horiz ABD  x Blue      Bicep curls 15x 6lb    Shrugs 15x 6lb    Wand Flexion    In mirror for visual feedback on scapular elevation    Flexion 20x 2 lb     Abduction  20x     Chest press 20x     OH press  20x           Prone   Total Gym - Level 35 HELD    Retraction      Ext      Rows      Horiz ABD             Seated      Pronation supination    20x 6 lb    Flexion  20x 3 lb  in standing this date    Abduction  20x 3 lb  With elbow flexed   Chest press  20x 3 lb    OH press 20x 3 lb    AROM ER   Scapular retraction         Supine      Chest  press    20x 2 lb     FW flexion     20x 2 lb     abduction 20x 2 lb     horizontal abduction 20x 2 lb     ER 20x 2 lb    Rhythmic Stabilization with shoulder at 90 degrees flex             Sidelying      Abduction  20x 2 lb    ER 20x 2 lb          Other:    Manual Therapy: Held    Treatment Charges: Mins Units Time in/ out   []  Modalities      [x]  Ther Exercise 40 3 11:00-11:40 am   [x]  Manual Therapy  --    []  Ther Activities      []  Aquatics      []  Vasocompression      []  Other      Total Treatment time 40 min 3        Assessment: [x] Progressing toward goals. See same date progress note    [] No change. [] Other:    [x] Patient would continue to benefit from skilled physical therapy services in order to: Patient will benefit from from physical therapy to restore R shoulder ROM, strength, and function to return to full duty work. STG/LTG  STG: (to be met in 18 treatments)  1. ? Pain:2/10 R shoulder pain with over head activity. 2. ? ROM: R shoulder flexion to 150, abduction to 90, ER and IR to 60 degrees to improve reaching. 3. ? Strength: Patient is able to lift and carry 50 lb box without Right shoulder pain. 4. ? Function: UEFI score to 75% functional to return to full duty work. 5. Independent with Home Exercise Programs      Pt.  Education:  [x] Yes  [] No  [x] Reviewed Prior HEP/Ed  Postural education provided throughout session. Decrease upper trap activation with exercises. Method of Education: [x] Verbal  [x] Demo  [] Written  Comprehension of Education:  [x] Verbalizes understanding. [x] Demonstrates understanding. [] Needs review. [x] Demonstrates/verbalizes HEP/Ed previously given. Plan: [x] Continue current frequency toward long and short term goals. [x] Specific Instructions for subsequent treatments: progress as able  Rehab Protocol   PHASE I:  1. Apply modalities as needed (heat, ice, electrotherapy, etc.)  2. Perform range of motion exercises (passive, active-assistive) avoid abduction, extension and external rotation (cocked position for throwing). o Rope and Pulley  o Wand  o Finger Walk  3. Posterior cuff stretch in supine (cross arm adduction)  4. Manual stretching avoiding stretching to the anterior capsule (ER in the scapular plane and no shoulder extension)  5. Functional behind the back stretch (IR towel stretch) if needed  6. Mobilization of posterior cuff if needed  7. Elastic resistance for IR/ER with arm at side and elbow at 90 degrees (pain free ROM with ER): and scapular strengthening (shrugs, rows, etc.)  8. UBE  9. DB exercises for:  o Supraspinatus - full can in the scapular plane below shoulder level. o Shoulder flexion  o Shoulder abduction (pain free)  o Shoulder extension in prone (do not move the shoulder past the plane of the body)  o Shoulder rows in prone  o Serratus punch in supine (push up plus program)  o Shoulder shrugs  o Forearm/elbow strengthening  10. Rhythmic stabilization exercises (begin in the supine position progressing to the functional planes of motion)  11. PNF patterns with gentle manual resistance (progress by working into the dysfunctional plane of motion)  PHASE II:  1. Continue posterior cuff stretching  2.  Continue shoulder strengthening exercises with free weights and elastic resistance (emphasize eccentric work on the rotator cuff, progress planes of motion to the 90/90 position)  3. Add lower trap pull downs with pulley system if available  4. Progress prone DB program by adding:  o horizontal abduction  o retraction with ER  o extension with palm forward  5. Plyotoss chest pass (progress to overhead and single arm)  6. Progress push plus program (wall push ups, modified floor, floor)  7. Begin progressive throwing program as advised by MD  8. Begin total body conditioning including a well organized core stability program for overhead athletes  9. Begin skill development at a low intensity level  10. Continue with rhythmic stabilization exercises with resistance and in the functional planes of motion. 11. Continue PNF patterns  12. Utilize manual resisted techniques or elastic resistance to emphasize eccentric loading for the posterior cuff. PHASE III:  Focus on progressive exercises in preparation for returning to the prior activity level. 1. Continue flexibility/mobility exercises  2. Continue progressive throwing program  3. Continue with strengthening  4. May add overhead strengthening ( press)  5. May progress to bench program.  o Regular  o Incline  o Decline  6. Continue UBE  7. Continue total body conditioning  8. Progress skill development. Begin practicing skills specific to the activity (work, recreational activity, sport, etc.).       Time In: 11:00 am          Time Out: 11:40 am    Electronically signed by:  Benedicto Phelsp PT

## 2021-07-14 NOTE — PROGRESS NOTES
[x] [facility] CENTER &  Therapy  [address]  [phone]  F: [phone] [] [address]  [facility]   [address]  P: [phone]  F: [phone] [] [address]  [address]  P: [phone]  F: [phone] [] [address]  P: [phone]  F: [phone] [] [address]  [address] [facility] [address]   [address]   [place]: [phone]  F: [phone]      Physical Therapy Progress Note    Date: 2021      Patient: Jake Díaz  : 1965  MRN: [id number]    [address] [place]                       Insurance: [facility] 18 visits 21-21 extended to 21  Medical Diagnosis: Right shoulder pain, unspecified chronicity (M25.511 [ICD-10-CM]); Fall from ladder, subsequent encounter (W11. XXXD [ICD-10-CM]); Anterior dislocation of right humerus, subsequent encounter (S43.014D [ICD-10-CM])                          Rehab Codes: M25.511, M25.611  Onset Date: 21                                Next 's appt: 21  Visits:                                        Cancels/No Shows: 0/0  Date range of services:  21 to 21    Subjective:  Pain:  [x]? Yes  []? No   Location: R shoulder   Pain Rating: (0-10 scale) 5/10  Pain altered Tx:  [x]? No  []? Yes  Action:  Comments: Patient notes frustration with continued shoulder pain and limited function. Objective:  Test Measurements:   AROM AROM STRENGTH  STRENGTH    LEFT RIGHT LEFT RIGHT   SHLD FLEX  120   4/5   SHLD ABD  90  4/5   SHLD ER  50  4/5   SHLD IR  80  4/5     Function: UEFI score 23% functional   12 pound box lift: able to lift and carry at side but unable to lift past waist height due to pain.      Assessment:  Patient has made mild

## 2021-07-16 ENCOUNTER — HOSPITAL ENCOUNTER (OUTPATIENT)
Dept: PHYSICAL THERAPY | Age: 56
Setting detail: THERAPIES SERIES
Discharge: HOME | End: 2021-07-16
Payer: COMMERCIAL

## 2021-07-16 PROCEDURE — 97110 THERAPEUTIC EXERCISES: CPT

## 2021-07-16 NOTE — FLOWSHEET NOTE
[x] [facility] CENTER &  Therapy  [address]  [phone]  F: [phone] [] [address]  [address] [address]   [address]  P: [phone]  F: [phone] [] [place]  [address]  P: [phone]  F: [phone] [] [address]  P: [phone]  F: [phone] [] [address]  [facility]   [address]   [place]: [phone]  F: [phone]      Physical Therapy Daily Treatment Note    Date:  2021  Patient Name:  Cassi Kauffman    :  1965  MRN: [id number]  Physician: Rebeka Castillo DO                     Insurance: [facility] 18 visits 21-21 extended to 21  Medical Diagnosis: Right shoulder pain, unspecified chronicity (M25.511 [ICD-10-CM]); Fall from ladder, subsequent encounter (W11. XXXD [ICD-10-CM]); Anterior dislocation of right humerus, subsequent encounter (S43.014D [ICD-10-CM])                          Rehab Codes: M25.511, M25.611  Onset Date: 21                                Next 's appt: 21  Visits:     Cancels/No Shows: 0/0    Subjective:    Pain:  [x] Yes  [] No Location: R shoulder  Pain Rating: (0-10 scale) 5/10  Pain altered Tx:  [x] No  [] Yes  Action:  Comments: Patient arrives stating continued pain and symptoms, but notes improvements compared to starting PT.     Objective:  Modalities: Vasopneumatic cryotherapy device (GameReady) to R shoulder at low pressure and 38 degrees x 15 min--patient declined need stating he can ice at home   Precautions: None  Exercises:  Exercise Reps/ Time Weight/ Level Comments   Pulleys 2 min      UBE   2/2 min  L2          Standing      Towel slides 15x  Flexion, abduction, circles   Scap retraction      Tband Row  20x blue Tband ext 20x blue    Tband tricep 20x blue    Tband Biceps  20x blue    T band jarrett ER 20x blue    Tband IR  20x blue    TBand Horiz ABD  x Blue      Bicep curls 15x 6lb    Shrugs 15x 6lb    Wand Flexion    In mirror for visual feedback on scapular elevation    Flexion 20x 2 lb     Abduction  20x     Chest press 20x     OH press  20x           Prone   Total Gym - Level 35 HELD    Retraction      Ext      Rows      Horiz ABD             Seated      Pronation supination    20x 6 lb    Flexion  20x 3 lb  in standing this date    Abduction  20x 3 lb  With elbow flexed   Chest press  20x 3 lb    OH press 20x 3 lb    AROM ER   Scapular retraction         Supine      Chest  press    20x 2 lb     FW flexion     20x 2 lb     abduction 20x 2 lb     horizontal abduction 20x 2 lb     ER 20x 2 lb    Rhythmic Stabilization with shoulder at 90 degrees flex             Sidelying      Abduction  20x 2 lb    ER 20x 2 lb          Other:    Manual Therapy: Held    Treatment Charges: Mins Units Time in/ out   []  Modalities      [x]  Ther Exercise 40 3 11:00-11:40 am   []  Manual Therapy      []  Ther Activities      []  Aquatics      []  Vasocompression      []  Other      Total Treatment time 40 min 3        Assessment: [x] Progressing toward goals. Reviewed theraband and free weight exercises this date for carryover to HEP with good verbal understanding. Issued written handout and blue theraband for continued strengthening with HEP as well. Educated to contact clinic if any questions or concerns and in the future after follow up with referring doctor. [] No change. [] Other:    [x] Patient would continue to benefit from skilled physical therapy services in order to: Patient will benefit from from physical therapy to restore R shoulder ROM, strength, and function to return to full duty work. STG/LTG  STG: (to be met in 18 treatments)  1. ? Pain:2/10 R shoulder pain with over head activity.    2. ? ROM: R shoulder flexion to 150, abduction to 90, ER and IR to 60 degrees to improve reaching. 3. ? Strength: Patient is able to lift and carry 50 lb box without Right shoulder pain. 4. ? Function: UEFI score to 75% functional to return to full duty work. 5. Independent with Home Exercise Programs      Pt. Education:  [x] Yes  [] No  [x] Reviewed Prior HEP/Ed  Postural education provided throughout session. Decrease upper trap activation with exercises. Method of Education: [x] Verbal  [x] Demo  [x] Written  Comprehension of Education:  [x] Verbalizes understanding. [x] Demonstrates understanding. [] Needs review. [x] Demonstrates/verbalizes HEP/Ed previously given. Plan: [x] Continue current frequency toward long and short term goals. [x] Specific Instructions for subsequent treatments: progress as able  Rehab Protocol   PHASE I:  1. Apply modalities as needed (heat, ice, electrotherapy, etc.)  2. Perform range of motion exercises (passive, active-assistive) avoid abduction, extension and external rotation (cocked position for throwing). o Rope and Pulley  o Wand  o Finger Walk  3. Posterior cuff stretch in supine (cross arm adduction)  4. Manual stretching avoiding stretching to the anterior capsule (ER in the scapular plane and no shoulder extension)  5. Functional behind the back stretch (IR towel stretch) if needed  6. Mobilization of posterior cuff if needed  7. Elastic resistance for IR/ER with arm at side and elbow at 90 degrees (pain free ROM with ER): and scapular strengthening (shrugs, rows, etc.)  8. UBE  9. DB exercises for:  o Supraspinatus - full can in the scapular plane below shoulder level. o Shoulder flexion  o Shoulder abduction (pain free)  o Shoulder extension in prone (do not move the shoulder past the plane of the body)  o Shoulder rows in prone  o Serratus punch in supine (push up plus program)  o Shoulder shrugs  o Forearm/elbow strengthening  10.  Rhythmic stabilization exercises (begin in the supine position progressing to the functional planes of motion)  11. PNF patterns with gentle manual resistance (progress by working into the dysfunctional plane of motion)  PHASE II:  1. Continue posterior cuff stretching  2. Continue shoulder strengthening exercises with free weights and elastic resistance (emphasize eccentric work on the rotator cuff, progress planes of motion to the 90/90 position)  3. Add lower trap pull downs with pulley system if available  4. Progress prone DB program by adding:  o horizontal abduction  o retraction with ER  o extension with palm forward  5. Plyotoss chest pass (progress to overhead and single arm)  6. Progress push plus program (wall push ups, modified floor, floor)  7. Begin progressive throwing program as advised by MD  8. Begin total body conditioning including a well organized core stability program for overhead athletes  9. Begin skill development at a low intensity level  10. Continue with rhythmic stabilization exercises with resistance and in the functional planes of motion. 11. Continue PNF patterns  12. Utilize manual resisted techniques or elastic resistance to emphasize eccentric loading for the posterior cuff. PHASE III:  Focus on progressive exercises in preparation for returning to the prior activity level. 1. Continue flexibility/mobility exercises  2. Continue progressive throwing program  3. Continue with strengthening  4. May add overhead strengthening ( press)  5. May progress to bench program.  o Regular  o Incline  o Decline  6. Continue UBE  7. Continue total body conditioning  8. Progress skill development. Begin practicing skills specific to the activity (work, recreational activity, sport, etc.).       Time In: 11:00 am          Time Out: 11:40 am    Electronically signed by:  Shailesh Kimball PTA

## 2021-08-13 ENCOUNTER — OFFICE VISIT (OUTPATIENT)
Dept: ORTHOPEDIC SURGERY | Age: 56
End: 2021-08-13
Payer: COMMERCIAL

## 2021-08-13 VITALS — WEIGHT: 315 LBS | HEIGHT: 71 IN | BODY MASS INDEX: 44.1 KG/M2

## 2021-08-13 DIAGNOSIS — S43.014D ANTERIOR DISLOCATION OF RIGHT HUMERUS, SUBSEQUENT ENCOUNTER: Primary | ICD-10-CM

## 2021-08-13 PROCEDURE — 99214 OFFICE O/P EST MOD 30 MIN: CPT | Performed by: ORTHOPAEDIC SURGERY

## 2021-08-13 ASSESSMENT — ENCOUNTER SYMPTOMS
ABDOMINAL PAIN: 0
VOMITING: 0
CHEST TIGHTNESS: 0
APNEA: 0
COUGH: 0

## 2021-08-13 NOTE — PROGRESS NOTES
[facility]  [facility]  [address] [place] [address]  Dept: [phone]  Dept Fax: [phone]        Ambulatory Follow Up      Subjective:   Abbey Ozuna is a 50y.o. year old male who presents to our office today for routine followup regarding his   1. Anterior dislocation of right humerus, subsequent encounter    . Chief Complaint   Patient presents with    Follow-up     Right Shoulder, BWC        HPI- Abbey Ozuna  is a 50 y.o.   male who presents today in follow for right shoulder dislocation after a work related injury sustained on 4/6/21. The patient was last seen on 6/18/2021 and underwent treatment in the form of physical therapy. The patient states that therapy was stopped because of BWC. The patient notes that since he has been done with physical therapy his pain has returned. He is having a hard time working and completing his normal activities again. The patient has been working with restrictions of no lifting more than 10 pounds, no overhead work, and is okay to drive. Review of Systems   Constitutional: Negative for chills and fever. Respiratory: Negative for apnea, cough and chest tightness. Cardiovascular: Negative for chest pain and palpitations. Gastrointestinal: Negative for abdominal pain and vomiting. Genitourinary: Negative for difficulty urinating. Musculoskeletal: Positive for arthralgias (right shoulder). Negative for gait problem, joint swelling and myalgias. Neurological: Negative for dizziness, weakness and numbness. I have reviewed the CC, HPI, ROS, PMH, FHX, Social History, and if not present in this note, I have reviewed in the patient's chart. I agree with the documentation provided by other staff and have reviewed their documentation prior to providing my signature indicating agreement.     Objective :   Ht 5' 11\" (1.803 m)   Wt (!) 346 lb 3.2 oz (157 kg)   BMI 48.29 kg/m²  Body mass index is 48.29 kg/m². General: Gabrielle Blackwood is a 50 y.o. male who is alert and oriented and sitting comfortably in our office. Ortho Exam  MS: AB=90 degrees right shoulder, F=90 degrees right shoulder. Mild ER weakness right shoulder, Good IR strength right shoulder. Motor, sensory, vascular examination of the right upper extremity is grossly intact without focal deficits. No gross instability of the right shoulder is appreciated. No shoulder girdle muscle atrophy is noted. Neuro: alert and oriented to person and place. Eyes: Extra-ocular muscles intact  Mouth: Oral mucosa moist. No perioral lesions  Pulm: Respirations unlabored and regular. Symmetric chest excursion without outward deformity is noted. Skin: warm, well perfused  Psych:   Patient has good fund of knowledge and displays understanging of exam, diagnosis, and plan. Radiology:     No results found. Assessment:      1. Anterior dislocation of right humerus, subsequent encounter       Plan:      I feel that an MRI of the right shoulder is warranted at this time. I do think that formal therapy continues to be necessary for the patient in his healing process. The patient should continue to work with restrictions of  no lifting more than 10 pounds, no overhead work, and is okay to drive. SAP form to be sent to [facility] and a C-9 for formal therapy. Patient should follow up in the office in 6 weeks. Follow up:Return in about 6 weeks (around 9/24/2021). No orders of the defined types were placed in this encounter. No orders of the defined types were placed in this encounter. Lewis Rdz LPN am scribing for and in the presence of Dr. Adela Ruiz  8/15/2021 12:12 PM      I have reviewed and made changes accordingly to the work scribed by Pura Cárdenas LPN. The documentation accurately reflects work and decisions made by me. I have also reviewed documentation completed by clinical staff.     Adela Ruiz DO, [address] [place] [address]  8/15/2021 12:12 PM    This note is created with the assistance of a speech recognition program.  While intending to generate a document that actually reflects the content of the visit, the document can still have some errors including those of syntax and sound a like substitutions which may escape proof reading.  In such instances, actual meaning can be extrapolated by contextual diversion      Electronically signed by Juan Francisco Snyder DO, FAOAO on 8/15/2021 at 12:12 PM

## 2021-08-13 NOTE — LETTER
[facility]  [address] [facility]  Phone: [phone]  Fax: Stewart Vasquez,         August 13, 2021     Patient: Leela Santillan   YOB: 1965   Date of Visit: 8/13/2021       To Whom it May Concern:    Maddy Galan was seen in my clinic on 8/13/2021. He may return to work on 8/13/21 with restrictions of limited overhead work, no lifting more than 10 pounds and is okay to drive. If you have any questions or concerns, please don't hesitate to call.     Sincerely,     Marco Antonio Geller, DO